# Patient Record
Sex: FEMALE | Race: BLACK OR AFRICAN AMERICAN | NOT HISPANIC OR LATINO | Employment: OTHER | URBAN - METROPOLITAN AREA
[De-identification: names, ages, dates, MRNs, and addresses within clinical notes are randomized per-mention and may not be internally consistent; named-entity substitution may affect disease eponyms.]

---

## 2019-03-28 PROBLEM — R10.13 ABDOMINAL PAIN, EPIGASTRIC: Status: ACTIVE | Noted: 2019-03-28

## 2021-10-15 ENCOUNTER — OFFICE VISIT (OUTPATIENT)
Dept: ENDOCRINOLOGY | Facility: CLINIC | Age: 41
End: 2021-10-15
Payer: COMMERCIAL

## 2021-10-15 VITALS
HEIGHT: 66 IN | HEART RATE: 101 BPM | SYSTOLIC BLOOD PRESSURE: 126 MMHG | BODY MASS INDEX: 40.04 KG/M2 | WEIGHT: 249.13 LBS | DIASTOLIC BLOOD PRESSURE: 74 MMHG | OXYGEN SATURATION: 98 %

## 2021-10-15 DIAGNOSIS — E03.4 HYPOTHYROIDISM DUE TO ACQUIRED ATROPHY OF THYROID: ICD-10-CM

## 2021-10-15 DIAGNOSIS — E78.2 MIXED HYPERLIPIDEMIA: ICD-10-CM

## 2021-10-15 DIAGNOSIS — Z79.4 TYPE 2 DIABETES MELLITUS WITH MILD NONPROLIFERATIVE RETINOPATHY OF RIGHT EYE, WITH LONG-TERM CURRENT USE OF INSULIN, MACULAR EDEMA PRESENCE UNSPECIFIED: Primary | ICD-10-CM

## 2021-10-15 DIAGNOSIS — E11.3291 TYPE 2 DIABETES MELLITUS WITH MILD NONPROLIFERATIVE RETINOPATHY OF RIGHT EYE, WITH LONG-TERM CURRENT USE OF INSULIN, MACULAR EDEMA PRESENCE UNSPECIFIED: Primary | ICD-10-CM

## 2021-10-15 PROBLEM — R10.13 ABDOMINAL PAIN, EPIGASTRIC: Status: RESOLVED | Noted: 2019-03-28 | Resolved: 2021-10-15

## 2021-10-15 PROCEDURE — 99999 PR PBB SHADOW E&M-NEW PATIENT-LVL V: ICD-10-PCS | Mod: PBBFAC,,, | Performed by: STUDENT IN AN ORGANIZED HEALTH CARE EDUCATION/TRAINING PROGRAM

## 2021-10-15 PROCEDURE — 99999 PR PBB SHADOW E&M-NEW PATIENT-LVL V: CPT | Mod: PBBFAC,,, | Performed by: STUDENT IN AN ORGANIZED HEALTH CARE EDUCATION/TRAINING PROGRAM

## 2021-10-15 PROCEDURE — 99204 PR OFFICE/OUTPT VISIT, NEW, LEVL IV, 45-59 MIN: ICD-10-PCS | Mod: S$GLB,,, | Performed by: STUDENT IN AN ORGANIZED HEALTH CARE EDUCATION/TRAINING PROGRAM

## 2021-10-15 PROCEDURE — 99204 OFFICE O/P NEW MOD 45 MIN: CPT | Mod: S$GLB,,, | Performed by: STUDENT IN AN ORGANIZED HEALTH CARE EDUCATION/TRAINING PROGRAM

## 2021-10-15 RX ORDER — MELOXICAM 15 MG/1
15 TABLET ORAL DAILY PRN
COMMUNITY
Start: 2021-08-17 | End: 2022-09-10

## 2021-10-15 RX ORDER — TIMOLOL MALEATE 5 MG/ML
SOLUTION/ DROPS OPHTHALMIC
COMMUNITY
Start: 2021-10-11 | End: 2023-02-13

## 2021-10-15 RX ORDER — INSULIN LISPRO 100 [IU]/ML
INJECTION, SOLUTION INTRAVENOUS; SUBCUTANEOUS
COMMUNITY
Start: 2021-10-08 | End: 2022-01-07 | Stop reason: SDUPTHER

## 2021-10-15 RX ORDER — INSULIN GLARGINE 100 [IU]/ML
40 INJECTION, SOLUTION SUBCUTANEOUS NIGHTLY
Qty: 1 BOX | Refills: 5 | Status: SHIPPED | OUTPATIENT
Start: 2021-10-15 | End: 2022-01-31

## 2021-10-15 RX ORDER — DULAGLUTIDE 3 MG/.5ML
3 INJECTION, SOLUTION SUBCUTANEOUS
Qty: 4 PEN | Refills: 11 | Status: SHIPPED | OUTPATIENT
Start: 2021-10-15 | End: 2021-11-29 | Stop reason: SDUPTHER

## 2021-10-15 RX ORDER — BISOPROLOL FUMARATE 5 MG/1
5 TABLET, FILM COATED ORAL DAILY
COMMUNITY
Start: 2021-08-17

## 2021-10-15 RX ORDER — PEN NEEDLE, DIABETIC 31 GX5/16"
NEEDLE, DISPOSABLE MISCELLANEOUS
COMMUNITY
Start: 2021-05-01 | End: 2021-12-27 | Stop reason: SDUPTHER

## 2021-10-15 RX ORDER — DULAGLUTIDE 1.5 MG/.5ML
1.5 INJECTION, SOLUTION SUBCUTANEOUS
COMMUNITY
Start: 2021-09-22 | End: 2021-10-15

## 2021-10-15 RX ORDER — HYDROCHLOROTHIAZIDE 12.5 MG/1
12.5 TABLET ORAL EVERY MORNING
COMMUNITY
Start: 2021-08-24

## 2021-10-15 RX ORDER — ATORVASTATIN CALCIUM 40 MG/1
40 TABLET, FILM COATED ORAL NIGHTLY
COMMUNITY
Start: 2021-08-17

## 2021-10-15 RX ORDER — FLASH GLUCOSE SENSOR
1 KIT MISCELLANEOUS
Qty: 6 KIT | Refills: 3 | Status: SHIPPED | OUTPATIENT
Start: 2021-10-15 | End: 2022-01-13

## 2021-10-15 RX ORDER — ESCITALOPRAM OXALATE 20 MG/1
20 TABLET ORAL DAILY
COMMUNITY
Start: 2021-08-17

## 2021-10-15 RX ORDER — LANCING DEVICE
EACH MISCELLANEOUS
COMMUNITY
Start: 2021-06-17

## 2021-10-15 RX ORDER — DIPHENHYDRAMINE HCL 25 MG
TABLET ORAL
COMMUNITY
Start: 2021-06-17

## 2021-10-15 RX ORDER — INSULIN GLARGINE 100 [IU]/ML
INJECTION, SOLUTION SUBCUTANEOUS
COMMUNITY
Start: 2021-09-22 | End: 2021-10-15 | Stop reason: SDUPTHER

## 2021-10-15 RX ORDER — CYCLOBENZAPRINE HCL 5 MG
5 TABLET ORAL 2 TIMES DAILY PRN
COMMUNITY
Start: 2021-07-28 | End: 2022-09-10

## 2021-10-15 RX ORDER — CALCIUM CITRATE/VITAMIN D3 200MG-6.25
TABLET ORAL
COMMUNITY
Start: 2021-08-17

## 2021-10-18 DIAGNOSIS — E03.4 HYPOTHYROIDISM DUE TO ACQUIRED ATROPHY OF THYROID: Primary | ICD-10-CM

## 2021-10-18 RX ORDER — LEVOTHYROXINE SODIUM 112 UG/1
112 TABLET ORAL
Qty: 30 TABLET | Refills: 11 | Status: SHIPPED | OUTPATIENT
Start: 2021-10-18 | End: 2021-12-14

## 2021-10-20 ENCOUNTER — TELEPHONE (OUTPATIENT)
Dept: ENDOCRINOLOGY | Facility: CLINIC | Age: 41
End: 2021-10-20

## 2021-11-29 ENCOUNTER — OFFICE VISIT (OUTPATIENT)
Dept: ENDOCRINOLOGY | Facility: CLINIC | Age: 41
End: 2021-11-29
Payer: COMMERCIAL

## 2021-11-29 VITALS
DIASTOLIC BLOOD PRESSURE: 94 MMHG | HEIGHT: 66 IN | HEART RATE: 81 BPM | BODY MASS INDEX: 40.39 KG/M2 | OXYGEN SATURATION: 98 % | SYSTOLIC BLOOD PRESSURE: 138 MMHG | WEIGHT: 251.31 LBS

## 2021-11-29 DIAGNOSIS — E11.3291 TYPE 2 DIABETES MELLITUS WITH MILD NONPROLIFERATIVE RETINOPATHY OF RIGHT EYE, WITH LONG-TERM CURRENT USE OF INSULIN, MACULAR EDEMA PRESENCE UNSPECIFIED: ICD-10-CM

## 2021-11-29 DIAGNOSIS — Z79.4 TYPE 2 DIABETES MELLITUS WITH MILD NONPROLIFERATIVE RETINOPATHY OF RIGHT EYE, WITH LONG-TERM CURRENT USE OF INSULIN, MACULAR EDEMA PRESENCE UNSPECIFIED: ICD-10-CM

## 2021-11-29 DIAGNOSIS — E78.2 MIXED HYPERLIPIDEMIA: ICD-10-CM

## 2021-11-29 DIAGNOSIS — E03.4 HYPOTHYROIDISM DUE TO ACQUIRED ATROPHY OF THYROID: Primary | ICD-10-CM

## 2021-11-29 PROCEDURE — 99214 PR OFFICE/OUTPT VISIT, EST, LEVL IV, 30-39 MIN: ICD-10-PCS | Mod: 25,S$GLB,, | Performed by: STUDENT IN AN ORGANIZED HEALTH CARE EDUCATION/TRAINING PROGRAM

## 2021-11-29 PROCEDURE — 99214 OFFICE O/P EST MOD 30 MIN: CPT | Mod: 25,S$GLB,, | Performed by: STUDENT IN AN ORGANIZED HEALTH CARE EDUCATION/TRAINING PROGRAM

## 2021-11-29 PROCEDURE — 95251 PR GLUCOSE MONITOR, 72 HOUR, PHYS INTERP: ICD-10-PCS | Mod: S$GLB,,, | Performed by: STUDENT IN AN ORGANIZED HEALTH CARE EDUCATION/TRAINING PROGRAM

## 2021-11-29 PROCEDURE — 95251 CONT GLUC MNTR ANALYSIS I&R: CPT | Mod: S$GLB,,, | Performed by: STUDENT IN AN ORGANIZED HEALTH CARE EDUCATION/TRAINING PROGRAM

## 2021-11-29 PROCEDURE — 99999 PR PBB SHADOW E&M-EST. PATIENT-LVL III: CPT | Mod: PBBFAC,,, | Performed by: STUDENT IN AN ORGANIZED HEALTH CARE EDUCATION/TRAINING PROGRAM

## 2021-11-29 PROCEDURE — 99999 PR PBB SHADOW E&M-EST. PATIENT-LVL III: ICD-10-PCS | Mod: PBBFAC,,, | Performed by: STUDENT IN AN ORGANIZED HEALTH CARE EDUCATION/TRAINING PROGRAM

## 2021-11-29 RX ORDER — TRAMADOL HYDROCHLORIDE 50 MG/1
50 TABLET ORAL 2 TIMES DAILY PRN
COMMUNITY
Start: 2021-10-28 | End: 2022-09-10

## 2021-11-29 RX ORDER — DULAGLUTIDE 3 MG/.5ML
3 INJECTION, SOLUTION SUBCUTANEOUS
Qty: 4 PEN | Refills: 11 | Status: SHIPPED | OUTPATIENT
Start: 2021-11-29 | End: 2022-12-05 | Stop reason: SDUPTHER

## 2021-11-29 RX ORDER — DULOXETIN HYDROCHLORIDE 30 MG/1
30 CAPSULE, DELAYED RELEASE ORAL DAILY
COMMUNITY
Start: 2021-10-28

## 2021-11-29 RX ORDER — INSULIN LISPRO-AABC 100 [IU]/ML
INJECTION, SOLUTION SUBCUTANEOUS
COMMUNITY
Start: 2021-11-10 | End: 2022-01-07

## 2021-11-29 RX ORDER — EMPAGLIFLOZIN 25 MG/1
25 TABLET, FILM COATED ORAL DAILY
Qty: 90 TABLET | Refills: 3 | Status: CANCELLED | OUTPATIENT
Start: 2021-11-29

## 2021-11-29 RX ORDER — MEDROXYPROGESTERONE ACETATE 150 MG/ML
INJECTION, SUSPENSION INTRAMUSCULAR
COMMUNITY

## 2021-11-29 RX ORDER — FLUCONAZOLE 150 MG/1
150 TABLET ORAL ONCE
COMMUNITY
Start: 2021-10-28 | End: 2022-11-16 | Stop reason: SDUPTHER

## 2021-12-14 DIAGNOSIS — E03.4 HYPOTHYROIDISM DUE TO ACQUIRED ATROPHY OF THYROID: Primary | ICD-10-CM

## 2021-12-14 DIAGNOSIS — Z79.4 TYPE 2 DIABETES MELLITUS WITH MILD NONPROLIFERATIVE RETINOPATHY OF RIGHT EYE, WITH LONG-TERM CURRENT USE OF INSULIN, MACULAR EDEMA PRESENCE UNSPECIFIED: ICD-10-CM

## 2021-12-14 DIAGNOSIS — E11.3291 TYPE 2 DIABETES MELLITUS WITH MILD NONPROLIFERATIVE RETINOPATHY OF RIGHT EYE, WITH LONG-TERM CURRENT USE OF INSULIN, MACULAR EDEMA PRESENCE UNSPECIFIED: ICD-10-CM

## 2021-12-14 RX ORDER — LEVOTHYROXINE SODIUM 137 UG/1
137 TABLET ORAL
Qty: 30 TABLET | Refills: 11 | Status: SHIPPED | OUTPATIENT
Start: 2021-12-14 | End: 2022-05-04

## 2021-12-27 ENCOUNTER — TELEPHONE (OUTPATIENT)
Dept: ENDOCRINOLOGY | Facility: CLINIC | Age: 41
End: 2021-12-27
Payer: COMMERCIAL

## 2021-12-27 RX ORDER — PEN NEEDLE, DIABETIC 31 GX5/16"
NEEDLE, DISPOSABLE MISCELLANEOUS
Qty: 100 EACH | Refills: 5 | Status: SHIPPED | OUTPATIENT
Start: 2021-12-27 | End: 2022-06-07 | Stop reason: SDUPTHER

## 2022-01-07 ENCOUNTER — TELEPHONE (OUTPATIENT)
Dept: ENDOCRINOLOGY | Facility: CLINIC | Age: 42
End: 2022-01-07
Payer: COMMERCIAL

## 2022-01-07 DIAGNOSIS — E11.3291 TYPE 2 DIABETES MELLITUS WITH MILD NONPROLIFERATIVE RETINOPATHY OF RIGHT EYE, WITH LONG-TERM CURRENT USE OF INSULIN, MACULAR EDEMA PRESENCE UNSPECIFIED: Primary | ICD-10-CM

## 2022-01-07 DIAGNOSIS — Z79.4 TYPE 2 DIABETES MELLITUS WITH MILD NONPROLIFERATIVE RETINOPATHY OF RIGHT EYE, WITH LONG-TERM CURRENT USE OF INSULIN, MACULAR EDEMA PRESENCE UNSPECIFIED: Primary | ICD-10-CM

## 2022-01-07 RX ORDER — INSULIN LISPRO 100 [IU]/ML
15 INJECTION, SOLUTION INTRAVENOUS; SUBCUTANEOUS
Qty: 15 ML | Refills: 11 | Status: SHIPPED | OUTPATIENT
Start: 2022-01-07 | End: 2022-01-07

## 2022-01-07 RX ORDER — INSULIN ASPART 100 [IU]/ML
15 INJECTION, SOLUTION INTRAVENOUS; SUBCUTANEOUS
Qty: 15 ML | Refills: 11 | Status: SHIPPED | OUTPATIENT
Start: 2022-01-07 | End: 2022-01-18 | Stop reason: SDUPTHER

## 2022-01-07 NOTE — TELEPHONE ENCOUNTER
----- Message from Dilma Biswas sent at 2022 10:32 AM CST -----  Contact: PATIENT  Alejandrina Hernandez  MRN: [id number]  : 1980  PCP: Luciano Clark  Home Phone      [phone]  Work Phone      Not on file.  Mobile          [phone]      MESSAGE: Patient states that she has been having to give herself more insulin than is prescribed and she is now out.  She has not been able to give herself the medication all week and her insurance will not pay for it.  She would like to know if Dr. Roberts can rewrite the prescription to increase the dose and send a new prescription to [facility].        Phone: [phone]

## 2022-01-18 ENCOUNTER — TELEPHONE (OUTPATIENT)
Dept: NEUROLOGY | Facility: CLINIC | Age: 42
End: 2022-01-18
Payer: COMMERCIAL

## 2022-01-18 DIAGNOSIS — Z79.4 TYPE 2 DIABETES MELLITUS WITH MILD NONPROLIFERATIVE RETINOPATHY OF RIGHT EYE, WITH LONG-TERM CURRENT USE OF INSULIN, MACULAR EDEMA PRESENCE UNSPECIFIED: ICD-10-CM

## 2022-01-18 DIAGNOSIS — E11.3291 TYPE 2 DIABETES MELLITUS WITH MILD NONPROLIFERATIVE RETINOPATHY OF RIGHT EYE, WITH LONG-TERM CURRENT USE OF INSULIN, MACULAR EDEMA PRESENCE UNSPECIFIED: ICD-10-CM

## 2022-01-18 RX ORDER — INSULIN ASPART 100 [IU]/ML
INJECTION, SOLUTION INTRAVENOUS; SUBCUTANEOUS
Qty: 18 ML | Refills: 11 | Status: SHIPPED | OUTPATIENT
Start: 2022-01-18 | End: 2022-01-31

## 2022-01-31 ENCOUNTER — OFFICE VISIT (OUTPATIENT)
Dept: ENDOCRINOLOGY | Facility: CLINIC | Age: 42
End: 2022-01-31
Payer: COMMERCIAL

## 2022-01-31 VITALS
BODY MASS INDEX: 40.67 KG/M2 | DIASTOLIC BLOOD PRESSURE: 84 MMHG | HEIGHT: 66 IN | SYSTOLIC BLOOD PRESSURE: 142 MMHG | WEIGHT: 253.06 LBS

## 2022-01-31 DIAGNOSIS — E03.4 HYPOTHYROIDISM DUE TO ACQUIRED ATROPHY OF THYROID: ICD-10-CM

## 2022-01-31 DIAGNOSIS — E11.3291 TYPE 2 DIABETES MELLITUS WITH MILD NONPROLIFERATIVE RETINOPATHY OF RIGHT EYE, WITH LONG-TERM CURRENT USE OF INSULIN, MACULAR EDEMA PRESENCE UNSPECIFIED: ICD-10-CM

## 2022-01-31 DIAGNOSIS — E78.2 MIXED HYPERLIPIDEMIA: ICD-10-CM

## 2022-01-31 DIAGNOSIS — Z79.4 TYPE 2 DIABETES MELLITUS WITH MILD NONPROLIFERATIVE RETINOPATHY OF RIGHT EYE, WITH LONG-TERM CURRENT USE OF INSULIN, MACULAR EDEMA PRESENCE UNSPECIFIED: ICD-10-CM

## 2022-01-31 PROCEDURE — 95251 CONT GLUC MNTR ANALYSIS I&R: CPT | Mod: S$GLB,,, | Performed by: STUDENT IN AN ORGANIZED HEALTH CARE EDUCATION/TRAINING PROGRAM

## 2022-01-31 PROCEDURE — 99214 OFFICE O/P EST MOD 30 MIN: CPT | Mod: 25,S$GLB,, | Performed by: STUDENT IN AN ORGANIZED HEALTH CARE EDUCATION/TRAINING PROGRAM

## 2022-01-31 PROCEDURE — 99999 PR PBB SHADOW E&M-EST. PATIENT-LVL V: CPT | Mod: PBBFAC,,, | Performed by: STUDENT IN AN ORGANIZED HEALTH CARE EDUCATION/TRAINING PROGRAM

## 2022-01-31 PROCEDURE — 99999 PR PBB SHADOW E&M-EST. PATIENT-LVL V: ICD-10-PCS | Mod: PBBFAC,,, | Performed by: STUDENT IN AN ORGANIZED HEALTH CARE EDUCATION/TRAINING PROGRAM

## 2022-01-31 PROCEDURE — 95251 PR GLUCOSE MONITOR, 72 HOUR, PHYS INTERP: ICD-10-PCS | Mod: S$GLB,,, | Performed by: STUDENT IN AN ORGANIZED HEALTH CARE EDUCATION/TRAINING PROGRAM

## 2022-01-31 PROCEDURE — 99214 PR OFFICE/OUTPT VISIT, EST, LEVL IV, 30-39 MIN: ICD-10-PCS | Mod: 25,S$GLB,, | Performed by: STUDENT IN AN ORGANIZED HEALTH CARE EDUCATION/TRAINING PROGRAM

## 2022-01-31 RX ORDER — TOBRAMYCIN 3 MG/ML
SOLUTION/ DROPS OPHTHALMIC
COMMUNITY
Start: 2022-01-19 | End: 2023-02-13

## 2022-01-31 RX ORDER — INSULIN LISPRO 100 [IU]/ML
20 INJECTION, SOLUTION INTRAVENOUS; SUBCUTANEOUS
Qty: 18 ML | Refills: 11 | Status: SHIPPED | OUTPATIENT
Start: 2022-01-31 | End: 2023-02-16 | Stop reason: SDUPTHER

## 2022-01-31 RX ORDER — FLASH GLUCOSE SENSOR
KIT MISCELLANEOUS
COMMUNITY
Start: 2022-01-13 | End: 2022-08-03 | Stop reason: SDUPTHER

## 2022-01-31 RX ORDER — INSULIN GLARGINE 300 [IU]/ML
60 INJECTION, SOLUTION SUBCUTANEOUS NIGHTLY
Qty: 4 PEN | Refills: 11 | Status: SHIPPED | OUTPATIENT
Start: 2022-01-31 | End: 2022-10-03 | Stop reason: SDUPTHER

## 2022-01-31 RX ORDER — METFORMIN HYDROCHLORIDE 500 MG/1
500 TABLET, EXTENDED RELEASE ORAL 2 TIMES DAILY WITH MEALS
Qty: 180 TABLET | Refills: 3 | Status: SHIPPED | OUTPATIENT
Start: 2022-01-31 | End: 2023-01-10 | Stop reason: SDUPTHER

## 2022-01-31 RX ORDER — INSULIN LISPRO 100 [IU]/ML
20 INJECTION, SOLUTION INTRAVENOUS; SUBCUTANEOUS
COMMUNITY
Start: 2022-01-07 | End: 2022-01-31

## 2022-01-31 NOTE — ASSESSMENT & PLAN NOTE
Levothyroxine increased to 137 mcg about 8 weeks ago  TSH slightly below goal  Will keep dose for now - she is young with no CVD   If TSH on next check will slightly reduce dose (will need and average dose in between 125 and 137)

## 2022-01-31 NOTE — PROGRESS NOTES
Subjective:      Patient ID: Alejandrina Hernandez is a 41 y.o. female.    Chief Complaint:  Follow-up      History of Present Illness  This is a 41 y.o. female. with a past medical history of DM2, HLD, HTN, obesity here for DM2.    With regards to type 2 diabetes mellitus  Diagnosed in late 20s - had frequent miscarriages which prompted workup    Current diabetic medications:  - Lantus 48 U HS   - Humalog 15 U before meals + sliding scale  - Trulicity 3 mg SQ weekly    Past diabetes medications:  - Metformin - diarrhea, has not tried XR formulation  - Jardiance - frequent yeast infections    Known diabetic complications: retinopathy    Weight trend: stable  Prior visit with diabetes education: yes    Current diet: 2 meals usually, sometimes 3  Current exercise: Limited with back pain    Wt Readings from Last 10 Encounters:   01/31/22 114.8 kg (253 lb 1.4 oz)   11/29/21 114 kg (251 lb 5.2 oz)   11/25/21 114.3 kg (252 lb)   10/15/21 113 kg (249 lb 1.9 oz)   08/11/21 106.6 kg (235 lb)   09/22/20 106.8 kg (235 lb 8 oz)   09/16/19 104.3 kg (230 lb)   03/27/19 114.3 kg (252 lb)   05/29/17 108 kg (238 lb)   10/17/12 114.2 kg (251 lb 12.3 oz)           Reviewed CGM data for past 14 days:  Average glucose 312 mg/dL  Time in range 1%  Time above range 99%  Hypoglycemia 0%  GMI 10.8%      Mother DM2  Maternal grandfather DM2  Paternal grandmother DM2  A lot of cousins and aunts with DM2    Diabetes Management Status  Statin: Taking  ACE/ARB: Not taking    Screening or Prevention Patient's value   HgA1C Testing and Control   Lab Results   Component Value Date    HGBA1C 10.3 (H) 01/28/2022      Lipid profile : 10/18/2021   LDL control Lab Results   Component Value Date    LDLCALC 72.4 10/18/2021      Nephropathy screening Lab Results   Component Value Date    MICALBCREAT 356.2 (H) 10/18/2021      Blood pressure BP Readings from Last 1 Encounters:   01/31/22 (!) 142/84      Dilated retinal exam Most Recent Eye Exam Date: Not Found  "  Foot exam : 10/15/2021       With regards to hypothyroidism  Currently on levothyroxine 137 mcg daily   Reports takes 30 min before eating or drinking anything other than water.     Lab Results   Component Value Date    TSH 0.282 (L) 01/28/2022         Review of Systems  As above    Social and family history reviewed  Current medications and allergies reviewed    Objective:   BP (!) 142/84 (BP Location: Right arm, Patient Position: Sitting)   Ht 5' 6" (1.676 m)   Wt 114.8 kg (253 lb 1.4 oz)   BMI 40.85 kg/m²   Physical Exam   Some areas of lipodystrophy in abdominal region      BP Readings from Last 1 Encounters:   01/31/22 (!) 142/84      Wt Readings from Last 1 Encounters:   01/31/22 0918 114.8 kg (253 lb 1.4 oz)     Body mass index is 40.85 kg/m².    Lab Review:   Lab Results   Component Value Date    HGBA1C 10.3 (H) 01/28/2022     Lab Results   Component Value Date    CHOL 137 10/18/2021    HDL 32 (L) 10/18/2021    LDLCALC 72.4 10/18/2021    TRIG 163 (H) 10/18/2021    CHOLHDL 23.4 10/18/2021     Lab Results   Component Value Date     01/28/2022    K 4.3 01/28/2022     01/28/2022    CO2 24 01/28/2022     (H) 01/28/2022    BUN 11 01/28/2022    CREATININE 0.8 01/28/2022    CALCIUM 9.5 01/28/2022    PROT 7.3 01/28/2022    ALBUMIN 4.1 01/28/2022    BILITOT 0.8 01/28/2022    ALKPHOS 43 (L) 01/28/2022    AST 15 01/28/2022    ALT 27 01/28/2022    ANIONGAP 11 01/28/2022    ESTGFRAFRICA >60.0 01/28/2022    EGFRNONAA >60.0 01/28/2022    TSH 0.282 (L) 01/28/2022       All pertinent labs reviewed    Assessment and Plan     Type 2 diabetes mellitus with mild nonproliferative retinopathy of right eye, with long-term current use of insulin  Reviewed CGM data. Uncontrolled with time in range only 1% and above range 99%. GMI and A1c > 10%. A C-peptide was detectable so we should try to target insulin resistance as much as possible. Already on GLP-1 RA. Discussed trying metformin XR - agreeable.    Can " consider re-adding SGLT-2i later on - explained that risk for yeast infections tends to improve after glucose control achieved.    For now will increase basal/bolus regimen by 20%.    Will change glargine to U-300.    Plan  - Change Lantus to Toujeo 60 U HS  - Increase Humalog to 20 U AC + SSI  - Start metformin  mg daily, increase to BID after 1 week if tolerated  - Continue Trulicity to 3 mg SQ weekly  - Continue FreeStyle Maritza 2 CGM  - Yearly eye exam encouraged  - Proper foot care discussed  - F/u 4 weeks    Mixed hyperlipidemia  LDL at goal  Continue statin    Hypothyroidism due to acquired atrophy of thyroid  Levothyroxine increased to 137 mcg about 8 weeks ago  TSH slightly below goal  Will keep dose for now - she is young with no CVD   If TSH on next check will slightly reduce dose (will need and average dose in between 125 and 137)    F/u 4 weeks    Mendez Roberts MD  Endocrinology

## 2022-01-31 NOTE — ASSESSMENT & PLAN NOTE
Reviewed CGM data. Uncontrolled with time in range only 1% and above range 99%. GMI and A1c > 10%. A C-peptide was detectable so we should try to target insulin resistance as much as possible. Already on GLP-1 RA. Discussed trying metformin XR - agreeable.    Can consider re-adding SGLT-2i later on - explained that risk for yeast infections tends to improve after glucose control achieved.    For now will increase basal/bolus regimen by 20%.    Will change glargine to U-300.    Plan  - Change Lantus to Toujeo 60 U HS  - Increase Humalog to 20 U AC + SSI  - Start metformin  mg daily, increase to BID after 1 week if tolerated  - Continue Trulicity to 3 mg SQ weekly  - Continue FreeStyle Maritza 2 CGM  - Yearly eye exam encouraged  - Proper foot care discussed  - F/u 4 weeks

## 2022-01-31 NOTE — PATIENT INSTRUCTIONS
Increase Lantus to 60 units daily - after you ran out of the Lantus - please use Toujeo 60 units daily    Continue Humalog 20 units before meals    Start metformin extended release one tablet a day - if after 1 week you are tolerating it, please increase to twice a day. Metformin should be taken with meals.

## 2022-03-04 ENCOUNTER — OFFICE VISIT (OUTPATIENT)
Dept: ENDOCRINOLOGY | Facility: CLINIC | Age: 42
End: 2022-03-04
Payer: COMMERCIAL

## 2022-03-04 VITALS
DIASTOLIC BLOOD PRESSURE: 88 MMHG | SYSTOLIC BLOOD PRESSURE: 134 MMHG | HEIGHT: 66 IN | WEIGHT: 253.06 LBS | BODY MASS INDEX: 40.67 KG/M2

## 2022-03-04 DIAGNOSIS — E11.3291 TYPE 2 DIABETES MELLITUS WITH MILD NONPROLIFERATIVE RETINOPATHY OF RIGHT EYE, WITH LONG-TERM CURRENT USE OF INSULIN, MACULAR EDEMA PRESENCE UNSPECIFIED: Primary | ICD-10-CM

## 2022-03-04 DIAGNOSIS — E78.2 MIXED HYPERLIPIDEMIA: ICD-10-CM

## 2022-03-04 DIAGNOSIS — Z79.4 TYPE 2 DIABETES MELLITUS WITH MILD NONPROLIFERATIVE RETINOPATHY OF RIGHT EYE, WITH LONG-TERM CURRENT USE OF INSULIN, MACULAR EDEMA PRESENCE UNSPECIFIED: Primary | ICD-10-CM

## 2022-03-04 DIAGNOSIS — E03.4 HYPOTHYROIDISM DUE TO ACQUIRED ATROPHY OF THYROID: ICD-10-CM

## 2022-03-04 PROCEDURE — 99214 OFFICE O/P EST MOD 30 MIN: CPT | Mod: 25,S$GLB,, | Performed by: STUDENT IN AN ORGANIZED HEALTH CARE EDUCATION/TRAINING PROGRAM

## 2022-03-04 PROCEDURE — 99214 PR OFFICE/OUTPT VISIT, EST, LEVL IV, 30-39 MIN: ICD-10-PCS | Mod: 25,S$GLB,, | Performed by: STUDENT IN AN ORGANIZED HEALTH CARE EDUCATION/TRAINING PROGRAM

## 2022-03-04 PROCEDURE — 99999 PR PBB SHADOW E&M-EST. PATIENT-LVL III: CPT | Mod: PBBFAC,,, | Performed by: STUDENT IN AN ORGANIZED HEALTH CARE EDUCATION/TRAINING PROGRAM

## 2022-03-04 PROCEDURE — 99999 PR PBB SHADOW E&M-EST. PATIENT-LVL III: ICD-10-PCS | Mod: PBBFAC,,, | Performed by: STUDENT IN AN ORGANIZED HEALTH CARE EDUCATION/TRAINING PROGRAM

## 2022-03-04 NOTE — ASSESSMENT & PLAN NOTE
TSH slightly below goal  Will keep dose for now - she is young with no CVD   If TSH on next check still low, will slightly reduce dose (will need and average dose in between 125 and 137)

## 2022-03-04 NOTE — PROGRESS NOTES
Subjective:      Patient ID: Alejandrina Hernandez is a 41 y.o. female.    Chief Complaint:  Follow-up      History of Present Illness  This is a 41 y.o. female. with a past medical history of DM2, HLD, HTN, obesity here for DM2.    With regards to type 2 diabetes mellitus  Diagnosed in late 20s - had frequent miscarriages which prompted workup    Current diabetic medications:  - Toujeo 60 U HS   - Humalog 20 U before meals + sliding scale  - Metformin  mg daily  - Trulicity 3 mg SQ weekly    Past diabetes medications:  - Metformin - diarrhea  - Jardiance - frequent yeast infections - persistent even after Diflucan     Known diabetic complications: retinopathy    Weight trend: stable  Prior visit with diabetes education: yes    Current diet: 2 meals usually, sometimes 3  Current exercise: Limited with back pain      Wt Readings from Last 5 Encounters:   03/04/22 114.8 kg (253 lb 1.4 oz)   01/31/22 114.8 kg (253 lb 1.4 oz)   11/29/21 114 kg (251 lb 5.2 oz)   11/25/21 114.3 kg (252 lb)   10/15/21 113 kg (249 lb 1.9 oz)         Reviewed CGM data for past 14 days:  Average glucose 253 mg/dL  Time in range 13%  Time above range 86%  Hypoglycemia 1%  GMI 9.4%    Mother DM2  Maternal grandfather DM2  Paternal grandmother DM2  A lot of cousins and aunts with DM2    Diabetes Management Status  Statin: Taking  ACE/ARB: Not taking - was on ACEi - cough    Screening or Prevention Patient's value   HgA1C Testing and Control   Lab Results   Component Value Date    HGBA1C 10.3 (H) 01/28/2022      Lipid profile : 10/18/2021   LDL control Lab Results   Component Value Date    LDLCALC 72.4 10/18/2021      Nephropathy screening Lab Results   Component Value Date    MICALBCREAT 356.2 (H) 10/18/2021      Blood pressure BP Readings from Last 1 Encounters:   03/04/22 134/88      Dilated retinal exam Most Recent Eye Exam Date: Not Found   Foot exam : 10/15/2021       With regards to hypothyroidism  Currently on levothyroxine 137 mcg  "daily   Reports takes 30 min before eating or drinking anything other than water.     Lab Results   Component Value Date    TSH 0.282 (L) 01/28/2022         Review of Systems  As above    Social and family history reviewed  Current medications and allergies reviewed    Objective:   /88 (BP Location: Right arm, Patient Position: Sitting)   Ht 5' 6" (1.676 m)   Wt 114.8 kg (253 lb 1.4 oz)   BMI 40.85 kg/m²   Physical Exam   Some areas of lipodystrophy in abdominal region      BP Readings from Last 1 Encounters:   03/04/22 134/88      Wt Readings from Last 1 Encounters:   03/04/22 0856 114.8 kg (253 lb 1.4 oz)     Body mass index is 40.85 kg/m².    Lab Review:   Lab Results   Component Value Date    HGBA1C 10.3 (H) 01/28/2022     Lab Results   Component Value Date    CHOL 137 10/18/2021    HDL 32 (L) 10/18/2021    LDLCALC 72.4 10/18/2021    TRIG 163 (H) 10/18/2021    CHOLHDL 23.4 10/18/2021     Lab Results   Component Value Date     01/28/2022    K 4.3 01/28/2022     01/28/2022    CO2 24 01/28/2022     (H) 01/28/2022    BUN 11 01/28/2022    CREATININE 0.8 01/28/2022    CALCIUM 9.5 01/28/2022    PROT 7.3 01/28/2022    ALBUMIN 4.1 01/28/2022    BILITOT 0.8 01/28/2022    ALKPHOS 43 (L) 01/28/2022    AST 15 01/28/2022    ALT 27 01/28/2022    ANIONGAP 11 01/28/2022    ESTGFRAFRICA >60.0 01/28/2022    EGFRNONAA >60.0 01/28/2022    TSH 0.282 (L) 01/28/2022       All pertinent labs reviewed    Assessment and Plan     Type 2 diabetes mellitus with mild nonproliferative retinopathy of right eye, with long-term current use of insulin  Control has improved with avg glucose down from 300 to 250. Still abvoe goal so will increase metformin and try to slowly get to max tolerate dose.     Can consider increasing Trulicity in the future. She dose not wish to go back on SGLT-2i given recurrent genital infections.    Plan  - Continue Toujeo 60 U HS  - Continue Humalog 20 U AC + SSI  - Increase metformin to " 1,000 mg daily - increase further if tolerated  - Continue Trulicity to 3 mg SQ weekly  - Continue FreeStyle Maritza 2 CGM  - Yearly eye exam encouraged  - Proper foot care discussed  - F/u 8 weeks with A1c    Hypothyroidism due to acquired atrophy of thyroid  TSH slightly below goal  Will keep dose for now - she is young with no CVD   If TSH on next check still low, will slightly reduce dose (will need and average dose in between 125 and 137)    Mixed hyperlipidemia  LDL at goal  Continue statin    F/u 8 weeks    Mendez Roberts MD  Endocrinology

## 2022-03-04 NOTE — ASSESSMENT & PLAN NOTE
Control has improved with avg glucose down from 300 to 250. Still abvoe goal so will increase metformin and try to slowly get to max tolerate dose.     Can consider increasing Trulicity in the future. She dose not wish to go back on SGLT-2i given recurrent genital infections.    Plan  - Continue Toujeo 60 U HS  - Continue Humalog 20 U AC + SSI  - Increase metformin to 1,000 mg daily - increase further if tolerated  - Continue Trulicity to 3 mg SQ weekly  - Continue FreeStyle Maritza 2 CGM  - Yearly eye exam encouraged  - Proper foot care discussed  - F/u 8 weeks with A1c

## 2022-03-04 NOTE — PATIENT INSTRUCTIONS
Increase metformin to 2 tablets, you can take them at the same time. If after 2 weeks you are tolerating you can increase to 3 tablets.    Continue same insulin doses    Continue Trulicity

## 2022-03-21 ENCOUNTER — TELEPHONE (OUTPATIENT)
Dept: ENDOCRINOLOGY | Facility: CLINIC | Age: 42
End: 2022-03-21
Payer: COMMERCIAL

## 2022-03-21 DIAGNOSIS — Z79.4 TYPE 2 DIABETES MELLITUS WITH MILD NONPROLIFERATIVE RETINOPATHY OF RIGHT EYE, WITH LONG-TERM CURRENT USE OF INSULIN, MACULAR EDEMA PRESENCE UNSPECIFIED: Primary | ICD-10-CM

## 2022-03-21 DIAGNOSIS — E11.3291 TYPE 2 DIABETES MELLITUS WITH MILD NONPROLIFERATIVE RETINOPATHY OF RIGHT EYE, WITH LONG-TERM CURRENT USE OF INSULIN, MACULAR EDEMA PRESENCE UNSPECIFIED: Primary | ICD-10-CM

## 2022-03-21 NOTE — TELEPHONE ENCOUNTER
----- Message from Sri Parks MA sent at 3/21/2022  8:36 AM CDT -----  Contact: self  Alejandrina Hernandez  MRN: [id number]  : 1980  PCP: Luciano Clark  Home Phone      [phone]  Work Phone      Not on file.  Mobile          [phone]      MESSAGE: Would like to see about getting referral to Dr Ley at [facility] for eyes.      Phone: [phone]

## 2022-03-28 ENCOUNTER — TELEPHONE (OUTPATIENT)
Dept: ENDOCRINOLOGY | Facility: CLINIC | Age: 42
End: 2022-03-28
Payer: COMMERCIAL

## 2022-03-28 NOTE — TELEPHONE ENCOUNTER
----- Message from Sir Parks MA sent at 3/28/2022  9:07 AM CDT -----  Contact: self  Alejandrina Hernandez  MRN: [id number]  : 1980  PCP: Luciano Clark  Home Phone      [phone]  Work Phone      Not on file.  Mobile          [phone]      MESSAGE: Would like to see about getting referral to Dr Ley at [facility] for eyes    Phone:  [phone]

## 2022-03-29 ENCOUNTER — TELEPHONE (OUTPATIENT)
Dept: OPHTHALMOLOGY | Facility: CLINIC | Age: 42
End: 2022-03-29
Payer: COMMERCIAL

## 2022-03-29 NOTE — TELEPHONE ENCOUNTER
----- Message from Ana Cisneros sent at 3/29/2022  8:25 AM CDT -----  Contact: Pt @ [phone]  Pt needing to schedule a new pt appt based off of referral in chart. Pls call her back to assist.

## 2022-05-02 ENCOUNTER — LAB VISIT (OUTPATIENT)
Dept: LAB | Facility: HOSPITAL | Age: 42
End: 2022-05-02
Attending: STUDENT IN AN ORGANIZED HEALTH CARE EDUCATION/TRAINING PROGRAM
Payer: COMMERCIAL

## 2022-05-02 DIAGNOSIS — E03.4 HYPOTHYROIDISM DUE TO ACQUIRED ATROPHY OF THYROID: ICD-10-CM

## 2022-05-02 DIAGNOSIS — E11.3291 TYPE 2 DIABETES MELLITUS WITH MILD NONPROLIFERATIVE RETINOPATHY OF RIGHT EYE, WITH LONG-TERM CURRENT USE OF INSULIN, MACULAR EDEMA PRESENCE UNSPECIFIED: ICD-10-CM

## 2022-05-02 DIAGNOSIS — Z79.4 TYPE 2 DIABETES MELLITUS WITH MILD NONPROLIFERATIVE RETINOPATHY OF RIGHT EYE, WITH LONG-TERM CURRENT USE OF INSULIN, MACULAR EDEMA PRESENCE UNSPECIFIED: ICD-10-CM

## 2022-05-02 LAB
ESTIMATED AVG GLUCOSE: 235 MG/DL (ref 68–131)
HBA1C MFR BLD: 9.8 % (ref 4–5.6)
T4 FREE SERPL-MCNC: 1.73 NG/DL (ref 0.71–1.51)
TSH SERPL DL<=0.005 MIU/L-ACNC: <0.01 UIU/ML (ref 0.4–4)

## 2022-05-02 PROCEDURE — 83036 HEMOGLOBIN GLYCOSYLATED A1C: CPT | Performed by: STUDENT IN AN ORGANIZED HEALTH CARE EDUCATION/TRAINING PROGRAM

## 2022-05-02 PROCEDURE — 84439 ASSAY OF FREE THYROXINE: CPT | Performed by: STUDENT IN AN ORGANIZED HEALTH CARE EDUCATION/TRAINING PROGRAM

## 2022-05-02 PROCEDURE — 36415 COLL VENOUS BLD VENIPUNCTURE: CPT | Performed by: STUDENT IN AN ORGANIZED HEALTH CARE EDUCATION/TRAINING PROGRAM

## 2022-05-02 PROCEDURE — 84443 ASSAY THYROID STIM HORMONE: CPT | Performed by: STUDENT IN AN ORGANIZED HEALTH CARE EDUCATION/TRAINING PROGRAM

## 2022-05-04 ENCOUNTER — OFFICE VISIT (OUTPATIENT)
Dept: ENDOCRINOLOGY | Facility: CLINIC | Age: 42
End: 2022-05-04
Payer: COMMERCIAL

## 2022-05-04 VITALS
WEIGHT: 248.69 LBS | SYSTOLIC BLOOD PRESSURE: 130 MMHG | HEIGHT: 66 IN | DIASTOLIC BLOOD PRESSURE: 86 MMHG | BODY MASS INDEX: 39.97 KG/M2

## 2022-05-04 DIAGNOSIS — E11.3291 TYPE 2 DIABETES MELLITUS WITH MILD NONPROLIFERATIVE RETINOPATHY OF RIGHT EYE, WITH LONG-TERM CURRENT USE OF INSULIN, MACULAR EDEMA PRESENCE UNSPECIFIED: Primary | ICD-10-CM

## 2022-05-04 DIAGNOSIS — Z79.4 TYPE 2 DIABETES MELLITUS WITH MILD NONPROLIFERATIVE RETINOPATHY OF RIGHT EYE, WITH LONG-TERM CURRENT USE OF INSULIN, MACULAR EDEMA PRESENCE UNSPECIFIED: Primary | ICD-10-CM

## 2022-05-04 DIAGNOSIS — E03.4 HYPOTHYROIDISM DUE TO ACQUIRED ATROPHY OF THYROID: ICD-10-CM

## 2022-05-04 DIAGNOSIS — E78.2 MIXED HYPERLIPIDEMIA: ICD-10-CM

## 2022-05-04 PROCEDURE — 99214 PR OFFICE/OUTPT VISIT, EST, LEVL IV, 30-39 MIN: ICD-10-PCS | Mod: 25,S$GLB,, | Performed by: STUDENT IN AN ORGANIZED HEALTH CARE EDUCATION/TRAINING PROGRAM

## 2022-05-04 PROCEDURE — 99214 OFFICE O/P EST MOD 30 MIN: CPT | Mod: 25,S$GLB,, | Performed by: STUDENT IN AN ORGANIZED HEALTH CARE EDUCATION/TRAINING PROGRAM

## 2022-05-04 PROCEDURE — 99999 PR PBB SHADOW E&M-EST. PATIENT-LVL IV: ICD-10-PCS | Mod: PBBFAC,,, | Performed by: STUDENT IN AN ORGANIZED HEALTH CARE EDUCATION/TRAINING PROGRAM

## 2022-05-04 PROCEDURE — 99999 PR PBB SHADOW E&M-EST. PATIENT-LVL IV: CPT | Mod: PBBFAC,,, | Performed by: STUDENT IN AN ORGANIZED HEALTH CARE EDUCATION/TRAINING PROGRAM

## 2022-05-04 PROCEDURE — 95251 PR GLUCOSE MONITOR, 72 HOUR, PHYS INTERP: ICD-10-PCS | Mod: S$GLB,,, | Performed by: STUDENT IN AN ORGANIZED HEALTH CARE EDUCATION/TRAINING PROGRAM

## 2022-05-04 PROCEDURE — 95251 CONT GLUC MNTR ANALYSIS I&R: CPT | Mod: S$GLB,,, | Performed by: STUDENT IN AN ORGANIZED HEALTH CARE EDUCATION/TRAINING PROGRAM

## 2022-05-04 RX ORDER — LEVOTHYROXINE SODIUM 125 UG/1
125 TABLET ORAL
Qty: 30 TABLET | Refills: 11 | Status: SHIPPED | OUTPATIENT
Start: 2022-05-04 | End: 2024-02-09

## 2022-05-04 NOTE — PATIENT INSTRUCTIONS
Change the thyroid pill to levothyroxine 125 mcg daily. Take fasting. If you forget to take, you may take 2 the next day.    Continue same medications for diabetes. At next visit if numbers still above 200 on average, may need to add a new medication or increase Toujeo.

## 2022-05-04 NOTE — ASSESSMENT & PLAN NOTE
Control has improved with avg glucose down from 250 to 205. She is unable to tolerate higher metformin doses. She dose not wish to go back on SGLT-2i given recurrent genital infections in the past. Benefit from increasing Trulicity will not be dramatic but can consider later on.    Given glucose has been improving visit by visit, will continue same regimen. She is becoming more aware about food choices.    Plan  - Continue Toujeo 60 U HS  - Continue Humalog 20 U AC + SSI  - Continue metformin 500 to 1,000 mg daily (sometimes takes 1 if having diarrhea)  - Continue Trulicity 3 mg SQ weekly  - Continue FreeStyle Maritza 2 CGM    F/u 3 months with labs

## 2022-05-04 NOTE — PROGRESS NOTES
Subjective:      Patient ID: Alejandrina Hernandez is a 41 y.o. female.    Chief Complaint:  Type 2 diabetes    History of Present Illness  This is a 41 y.o. female. with a past medical history of DM2, HLD, HTN, obesity here for DM2.    Type 2 diabetes mellitus  Diagnosed in late 20s - had frequent miscarriages which prompted workup    Current diabetic medications:  - Toujeo 60 U HS   - Humalog 20 U before meals + sliding scale  - Metformin  - 1,000 mg daily - some diarrhea  - Trulicity 3 mg SQ weekly    Past diabetes medications:  - Metformin - diarrhea  - Jardiance - frequent yeast infections - persistent even after Diflucan     Known diabetic complications: retinopathy    Weight trend: stable  Prior visit with diabetes education: yes    Current diet: 2 meals usually, sometimes 3  Current exercise: Limited with back pain      Wt Readings from Last 5 Encounters:   05/04/22 112.8 kg (248 lb 10.9 oz)   03/04/22 114.8 kg (253 lb 1.4 oz)   01/31/22 114.8 kg (253 lb 1.4 oz)   11/29/21 114 kg (251 lb 5.2 oz)   11/25/21 114.3 kg (252 lb)       Reviewed CGM data for past 14 days:  Average glucose 205 mg/dL  GMI: 8.2%  Time in range 30%  Time above range 70%  Hypoglycemia 0%      Mother DM2  Maternal grandfather DM2  Paternal grandmother DM2  A lot of cousins and aunts with DM2    Diabetes Management Status  Statin: Taking  ACE/ARB: Not taking - was on ACEi - cough    Screening or Prevention Patient's value   HgA1C Testing and Control   Lab Results   Component Value Date    HGBA1C 9.8 (H) 05/02/2022      Lipid profile : 10/18/2021   LDL control Lab Results   Component Value Date    LDLCALC 72.4 10/18/2021      Nephropathy screening Lab Results   Component Value Date    MICALBCREAT 356.2 (H) 10/18/2021      Blood pressure BP Readings from Last 1 Encounters:   05/04/22 130/86      Dilated retinal exam Most Recent Eye Exam Date: Not Found   Foot exam : 10/15/2021       Hypothyroidism  Currently on levothyroxine 137 mcg  "daily  Reports takes 30 min before eating or drinking anything other than water.     Lab Results   Component Value Date    TSH <0.010 (L) 05/02/2022         Review of Systems  As above    Social and family history reviewed  Current medications and allergies reviewed    Objective:   /86 (BP Location: Right arm, Patient Position: Sitting)   Ht 5' 6" (1.676 m)   Wt 112.8 kg (248 lb 10.9 oz)   BMI 40.14 kg/m²   Physical Exam   Some areas of lipodystrophy in abdominal region      BP Readings from Last 1 Encounters:   05/04/22 130/86      Wt Readings from Last 1 Encounters:   05/04/22 0846 112.8 kg (248 lb 10.9 oz)     Body mass index is 40.14 kg/m².    Lab Review:   Lab Results   Component Value Date    HGBA1C 9.8 (H) 05/02/2022     Lab Results   Component Value Date    CHOL 137 10/18/2021    HDL 32 (L) 10/18/2021    LDLCALC 72.4 10/18/2021    TRIG 163 (H) 10/18/2021    CHOLHDL 23.4 10/18/2021     Lab Results   Component Value Date     01/28/2022    K 4.3 01/28/2022     01/28/2022    CO2 24 01/28/2022     (H) 01/28/2022    BUN 11 01/28/2022    CREATININE 0.8 01/28/2022    CALCIUM 9.5 01/28/2022    PROT 7.3 01/28/2022    ALBUMIN 4.1 01/28/2022    BILITOT 0.8 01/28/2022    ALKPHOS 43 (L) 01/28/2022    AST 15 01/28/2022    ALT 27 01/28/2022    ANIONGAP 11 01/28/2022    ESTGFRAFRICA >60.0 01/28/2022    EGFRNONAA >60.0 01/28/2022    TSH <0.010 (L) 05/02/2022       All pertinent labs reviewed    Assessment and Plan     Type 2 diabetes mellitus with mild nonproliferative retinopathy of right eye, with long-term current use of insulin  Control has improved with avg glucose down from 250 to 205. She is unable to tolerate higher metformin doses. She dose not wish to go back on SGLT-2i given recurrent genital infections in the past. Benefit from increasing Trulicity will not be dramatic but can consider later on.    Given glucose has been improving visit by visit, will continue same regimen. She is " becoming more aware about food choices.    Plan  - Continue Toujeo 60 U HS  - Continue Humalog 20 U AC + SSI  - Continue metformin 500 to 1,000 mg daily (sometimes takes 1 if having diarrhea)  - Continue Trulicity 3 mg SQ weekly  - Continue FreeStyle Maritza 2 CGM    F/u 3 months with labs    Mixed hyperlipidemia  Continue statin  Recheck lipids with next labs    Hypothyroidism due to acquired atrophy of thyroid  TSH below goal  Decrease levothyroxine to 125 mcg daily  TSH with next labs    F/u 3 months    Mendez Roberts MD  Endocrinology

## 2022-05-13 ENCOUNTER — CLINICAL SUPPORT (OUTPATIENT)
Dept: OPHTHALMOLOGY | Facility: CLINIC | Age: 42
End: 2022-05-13
Payer: COMMERCIAL

## 2022-05-13 ENCOUNTER — OFFICE VISIT (OUTPATIENT)
Dept: OPHTHALMOLOGY | Facility: CLINIC | Age: 42
End: 2022-05-13
Payer: COMMERCIAL

## 2022-05-13 DIAGNOSIS — E11.3393 MODERATE NONPROLIFERATIVE DIABETIC RETINOPATHY OF BOTH EYES WITHOUT MACULAR EDEMA ASSOCIATED WITH TYPE 2 DIABETES MELLITUS: ICD-10-CM

## 2022-05-13 DIAGNOSIS — H25.13 NUCLEAR SCLEROSIS OF BOTH EYES: ICD-10-CM

## 2022-05-13 DIAGNOSIS — H40.003 GLAUCOMA SUSPECT OF BOTH EYES: Primary | ICD-10-CM

## 2022-05-13 PROCEDURE — 92133 POSTERIOR SEGMENT OCT OPTIC NERVE(OCULAR COHERENCE TOMOGRAPHY) - OU - BOTH EYES: ICD-10-PCS | Mod: S$GLB,,, | Performed by: STUDENT IN AN ORGANIZED HEALTH CARE EDUCATION/TRAINING PROGRAM

## 2022-05-13 PROCEDURE — 92020 GONIOSCOPY: CPT | Mod: S$GLB,,, | Performed by: STUDENT IN AN ORGANIZED HEALTH CARE EDUCATION/TRAINING PROGRAM

## 2022-05-13 PROCEDURE — 92083 EXTENDED VISUAL FIELD XM: CPT | Mod: S$GLB,,, | Performed by: STUDENT IN AN ORGANIZED HEALTH CARE EDUCATION/TRAINING PROGRAM

## 2022-05-13 PROCEDURE — 92083 HUMPHREY VISUAL FIELD - OU - BOTH EYES: ICD-10-PCS | Mod: S$GLB,,, | Performed by: STUDENT IN AN ORGANIZED HEALTH CARE EDUCATION/TRAINING PROGRAM

## 2022-05-13 PROCEDURE — 76514 ECHO EXAM OF EYE THICKNESS: CPT | Mod: S$GLB,,, | Performed by: STUDENT IN AN ORGANIZED HEALTH CARE EDUCATION/TRAINING PROGRAM

## 2022-05-13 PROCEDURE — 99204 OFFICE O/P NEW MOD 45 MIN: CPT | Mod: GC,S$GLB,, | Performed by: STUDENT IN AN ORGANIZED HEALTH CARE EDUCATION/TRAINING PROGRAM

## 2022-05-13 PROCEDURE — 99999 PR PBB SHADOW E&M-EST. PATIENT-LVL II: CPT | Mod: PBBFAC,,, | Performed by: STUDENT IN AN ORGANIZED HEALTH CARE EDUCATION/TRAINING PROGRAM

## 2022-05-13 PROCEDURE — 99204 PR OFFICE/OUTPT VISIT, NEW, LEVL IV, 45-59 MIN: ICD-10-PCS | Mod: GC,S$GLB,, | Performed by: STUDENT IN AN ORGANIZED HEALTH CARE EDUCATION/TRAINING PROGRAM

## 2022-05-13 PROCEDURE — 99999 PR PBB SHADOW E&M-EST. PATIENT-LVL II: ICD-10-PCS | Mod: PBBFAC,,, | Performed by: STUDENT IN AN ORGANIZED HEALTH CARE EDUCATION/TRAINING PROGRAM

## 2022-05-13 PROCEDURE — 76514 PR  US, EYE, FOR CORNEAL THICKNESS: ICD-10-PCS | Mod: S$GLB,,, | Performed by: STUDENT IN AN ORGANIZED HEALTH CARE EDUCATION/TRAINING PROGRAM

## 2022-05-13 PROCEDURE — 92020 PR SPECIAL EYE EVAL,GONIOSCOPY: ICD-10-PCS | Mod: S$GLB,,, | Performed by: STUDENT IN AN ORGANIZED HEALTH CARE EDUCATION/TRAINING PROGRAM

## 2022-05-13 PROCEDURE — 92133 CPTRZD OPH DX IMG PST SGM ON: CPT | Mod: S$GLB,,, | Performed by: STUDENT IN AN ORGANIZED HEALTH CARE EDUCATION/TRAINING PROGRAM

## 2022-05-13 NOTE — PROGRESS NOTES
Subjective:       Patient ID: Alejandrina Hernandez is a 41 y.o. female.    Chief Complaint: Glaucoma     HPI     Presents today for Glaucoma Evaluation--pt would like a second opinion.   Pt was diagnose with Glaucoma 2 years ago. Previously on glaucoma drops   but she stopped these 2 months ago. She is uncertain whether her diagnosis   was correct and wants a second opinion. Pt denies vision changes, eye pain   or discomfort. Pt reports that several months ago her A1c was 14% but now   it is down to 9%. She is on insulin.     HX of Laser OU x 2    MEDS:    Last edited by Kamila Ley MD on 5/13/2022 10:51 AM. (History)            Assessment & Plan   Glaucoma suspect of both eyes  -     Color Fundus Photography - OU - Both Eyes; Future  -     Posterior Segment OCT Optic Nerve- Both eyes; Future  -     Meyer Visual Field - OU - Extended - Both Eyes; Future    Nuclear sclerosis of both eyes    Moderate nonproliferative diabetic retinopathy of both eyes without macular edema associated with type 2 diabetes mellitus      GS OU  -Fhx (), Steroids (),Trauma()  -Drops: -   -Drop intolerance/contraindication: -  -Laser: SLT OU  -Surgeries:  -CCT: 541 // 535  -Gonio: SS with few areas of TM OU    Performed independent review of outside records including notes and tests  Discussed imaging and interpretation with patient.   Discussed eyedrops and if more than one, recommend 5 minutes between all drops.      5/22 HVF full OD // few nonspecific defects and rim artifact OS  5/22 RNFL Full OD // B N/TI OS    Monitor for now  IOP check 6 months - can be Nemoerbishnu  Repeat imaging x 1 year     DM2 with NPDR OU  Discussed  Tight BP/BS control   Recent improvement in A1c  Need Retina evaluation      PLAN  Retina 3 months    RTC with me 6 months IOP    Kamila Ley M.D., M.S.  Department of Ophthalmology   Division of Glaucoma Surgery  [facility]

## 2022-05-13 NOTE — PROGRESS NOTES
OCT DONE OU     24-2 LUIS FASTER DONE OU     REL & FIX =  GOOD OU     COOP =       GOOD     PATIENT DENIES ANY ALLERGIES TO LATEX OR ADHESIVES AT THIS TIME    JTHOMAS    NO MRX     USED AGE CORRECTION LENS FOR TEST

## 2022-06-07 RX ORDER — PEN NEEDLE, DIABETIC 31 GX5/16"
NEEDLE, DISPOSABLE MISCELLANEOUS
Qty: 150 EACH | Refills: 5 | Status: SHIPPED | OUTPATIENT
Start: 2022-06-07 | End: 2023-03-31 | Stop reason: SDUPTHER

## 2022-08-03 RX ORDER — FLASH GLUCOSE SENSOR
KIT MISCELLANEOUS
Qty: 2 KIT | Refills: 11 | Status: SHIPPED | OUTPATIENT
Start: 2022-08-03 | End: 2023-08-21 | Stop reason: SDUPTHER

## 2022-08-11 ENCOUNTER — OFFICE VISIT (OUTPATIENT)
Dept: OPHTHALMOLOGY | Facility: CLINIC | Age: 42
End: 2022-08-11
Payer: COMMERCIAL

## 2022-08-11 DIAGNOSIS — E11.3393 TYPE 2 DIABETES MELLITUS WITH BOTH EYES AFFECTED BY MODERATE NONPROLIFERATIVE RETINOPATHY WITHOUT MACULAR EDEMA, WITH LONG-TERM CURRENT USE OF INSULIN: Primary | ICD-10-CM

## 2022-08-11 DIAGNOSIS — Z79.4 TYPE 2 DIABETES MELLITUS WITH BOTH EYES AFFECTED BY MODERATE NONPROLIFERATIVE RETINOPATHY WITHOUT MACULAR EDEMA, WITH LONG-TERM CURRENT USE OF INSULIN: Primary | ICD-10-CM

## 2022-08-11 DIAGNOSIS — H40.003 GLAUCOMA SUSPECT OF BOTH EYES: ICD-10-CM

## 2022-08-11 DIAGNOSIS — E11.36 DIABETIC CATARACT OF BOTH EYES: ICD-10-CM

## 2022-08-11 PROCEDURE — 92014 PR EYE EXAM, EST PATIENT,COMPREHESV: ICD-10-PCS | Mod: S$GLB,,, | Performed by: OPHTHALMOLOGY

## 2022-08-11 PROCEDURE — 99999 PR PBB SHADOW E&M-EST. PATIENT-LVL II: CPT | Mod: PBBFAC,,, | Performed by: OPHTHALMOLOGY

## 2022-08-11 PROCEDURE — 3046F HEMOGLOBIN A1C LEVEL >9.0%: CPT | Mod: CPTII,S$GLB,, | Performed by: OPHTHALMOLOGY

## 2022-08-11 PROCEDURE — 92134 POSTERIOR SEGMENT OCT RETINA (OCULAR COHERENCE TOMOGRAPHY)-BOTH EYES: ICD-10-PCS | Mod: S$GLB,,, | Performed by: OPHTHALMOLOGY

## 2022-08-11 PROCEDURE — 1160F RVW MEDS BY RX/DR IN RCRD: CPT | Mod: CPTII,S$GLB,, | Performed by: OPHTHALMOLOGY

## 2022-08-11 PROCEDURE — 1159F MED LIST DOCD IN RCRD: CPT | Mod: CPTII,S$GLB,, | Performed by: OPHTHALMOLOGY

## 2022-08-11 PROCEDURE — 1159F PR MEDICATION LIST DOCUMENTED IN MEDICAL RECORD: ICD-10-PCS | Mod: CPTII,S$GLB,, | Performed by: OPHTHALMOLOGY

## 2022-08-11 PROCEDURE — 92014 COMPRE OPH EXAM EST PT 1/>: CPT | Mod: S$GLB,,, | Performed by: OPHTHALMOLOGY

## 2022-08-11 PROCEDURE — 1160F PR REVIEW ALL MEDS BY PRESCRIBER/CLIN PHARMACIST DOCUMENTED: ICD-10-PCS | Mod: CPTII,S$GLB,, | Performed by: OPHTHALMOLOGY

## 2022-08-11 PROCEDURE — 99999 PR PBB SHADOW E&M-EST. PATIENT-LVL II: ICD-10-PCS | Mod: PBBFAC,,, | Performed by: OPHTHALMOLOGY

## 2022-08-11 PROCEDURE — 3046F PR MOST RECENT HEMOGLOBIN A1C LEVEL > 9.0%: ICD-10-PCS | Mod: CPTII,S$GLB,, | Performed by: OPHTHALMOLOGY

## 2022-08-11 PROCEDURE — 92134 CPTRZ OPH DX IMG PST SGM RTA: CPT | Mod: S$GLB,,, | Performed by: OPHTHALMOLOGY

## 2022-08-12 NOTE — PROGRESS NOTES
"Subjective:       Patient ID: Alejandrina Hernandez is a 41 y.o. female      Chief Complaint   Patient presents with    Referral    Diabetic Eye Exam     History of Present Illness  HPI     NP Referral by Dr. Lye for the Treatment of NPDR OU w/o ME    DLS 05/13/2022 by Dr. Kamila Ley MD    Cc: pt states vision is blurry at times.    ++eye pain ( h/o sinus)  ++blurred VA OU  --diplopia  --headaches  --veils/curtains/shadows    Eye Meds: NONE    DM x "few years"  Pt not sure how long    POHx  1. Glaucoma Suspect OU  2. HX of Laser OU x 2   3. Type II DM w/ Mod. NPDR w/o ME    Last edited by Bobo Lion MD on 8/12/2022  4:57 PM. (History)        Imaging:    See report    Assessment/Plan:     1. Type 2 diabetes mellitus with both eyes affected by moderate nonproliferative retinopathy without macular edema, with long-term current use of insulin  Diabetic Retinopathy discussed in detail, all questions answered  Stressed importance of good BS/BP/Chol Control  RTC immediately PRN any vision changes, hugh blurry vision, missing vision, floaters, distortions, etc    - OCT- Retina; Future  - Posterior Segment OCT Retina-Both eyes    2. Glaucoma suspect of both eyes  Keep f/u with Dr Ley    3. Diabetic cataract of both eyes  Excellent pinhole vision  Refraction    Follow up in about 6 months (around 2/11/2023), or if symptoms worsen or fail to improve, for Comprehensive Examination, OCT Mac.     "

## 2022-10-03 DIAGNOSIS — Z79.4 TYPE 2 DIABETES MELLITUS WITH MILD NONPROLIFERATIVE RETINOPATHY OF RIGHT EYE, WITH LONG-TERM CURRENT USE OF INSULIN, MACULAR EDEMA PRESENCE UNSPECIFIED: ICD-10-CM

## 2022-10-03 DIAGNOSIS — E11.3291 TYPE 2 DIABETES MELLITUS WITH MILD NONPROLIFERATIVE RETINOPATHY OF RIGHT EYE, WITH LONG-TERM CURRENT USE OF INSULIN, MACULAR EDEMA PRESENCE UNSPECIFIED: ICD-10-CM

## 2022-10-03 RX ORDER — INSULIN GLARGINE 300 [IU]/ML
60 INJECTION, SOLUTION SUBCUTANEOUS NIGHTLY
Qty: 4 PEN | Refills: 11 | Status: SHIPPED | OUTPATIENT
Start: 2022-10-03 | End: 2023-05-16

## 2022-10-03 NOTE — TELEPHONE ENCOUNTER
----- Message from Abiola Meza sent at 10/3/2022 11:49 AM CDT -----  Contact: self  Alejandrina Hernandez  MRN: [id number]  : 1980  PCP: Luciano Clark  Home Phone      [phone]  Work Phone      Not on file.  [employer]          [phone]      MESSAGE: Refill on  [facility]       Phone  [phone]

## 2022-10-05 ENCOUNTER — OFFICE VISIT (OUTPATIENT)
Dept: ENDOCRINOLOGY | Facility: CLINIC | Age: 42
End: 2022-10-05
Payer: COMMERCIAL

## 2022-10-05 VITALS
HEART RATE: 82 BPM | HEIGHT: 66 IN | BODY MASS INDEX: 39.69 KG/M2 | SYSTOLIC BLOOD PRESSURE: 136 MMHG | WEIGHT: 246.94 LBS | DIASTOLIC BLOOD PRESSURE: 82 MMHG | RESPIRATION RATE: 16 BRPM

## 2022-10-05 DIAGNOSIS — E11.3291 TYPE 2 DIABETES MELLITUS WITH MILD NONPROLIFERATIVE RETINOPATHY OF RIGHT EYE, WITH LONG-TERM CURRENT USE OF INSULIN, MACULAR EDEMA PRESENCE UNSPECIFIED: Primary | ICD-10-CM

## 2022-10-05 DIAGNOSIS — E03.4 HYPOTHYROIDISM DUE TO ACQUIRED ATROPHY OF THYROID: ICD-10-CM

## 2022-10-05 DIAGNOSIS — E78.2 MIXED HYPERLIPIDEMIA: ICD-10-CM

## 2022-10-05 DIAGNOSIS — Z79.4 TYPE 2 DIABETES MELLITUS WITH MILD NONPROLIFERATIVE RETINOPATHY OF RIGHT EYE, WITH LONG-TERM CURRENT USE OF INSULIN, MACULAR EDEMA PRESENCE UNSPECIFIED: Primary | ICD-10-CM

## 2022-10-05 PROCEDURE — 3079F PR MOST RECENT DIASTOLIC BLOOD PRESSURE 80-89 MM HG: ICD-10-PCS | Mod: CPTII,S$GLB,, | Performed by: STUDENT IN AN ORGANIZED HEALTH CARE EDUCATION/TRAINING PROGRAM

## 2022-10-05 PROCEDURE — 3051F HG A1C>EQUAL 7.0%<8.0%: CPT | Mod: CPTII,S$GLB,, | Performed by: STUDENT IN AN ORGANIZED HEALTH CARE EDUCATION/TRAINING PROGRAM

## 2022-10-05 PROCEDURE — 95251 CONT GLUC MNTR ANALYSIS I&R: CPT | Mod: S$GLB,,, | Performed by: STUDENT IN AN ORGANIZED HEALTH CARE EDUCATION/TRAINING PROGRAM

## 2022-10-05 PROCEDURE — 3079F DIAST BP 80-89 MM HG: CPT | Mod: CPTII,S$GLB,, | Performed by: STUDENT IN AN ORGANIZED HEALTH CARE EDUCATION/TRAINING PROGRAM

## 2022-10-05 PROCEDURE — 3066F PR DOCUMENTATION OF TREATMENT FOR NEPHROPATHY: ICD-10-PCS | Mod: CPTII,S$GLB,, | Performed by: STUDENT IN AN ORGANIZED HEALTH CARE EDUCATION/TRAINING PROGRAM

## 2022-10-05 PROCEDURE — 3008F BODY MASS INDEX DOCD: CPT | Mod: CPTII,S$GLB,, | Performed by: STUDENT IN AN ORGANIZED HEALTH CARE EDUCATION/TRAINING PROGRAM

## 2022-10-05 PROCEDURE — 3066F NEPHROPATHY DOC TX: CPT | Mod: CPTII,S$GLB,, | Performed by: STUDENT IN AN ORGANIZED HEALTH CARE EDUCATION/TRAINING PROGRAM

## 2022-10-05 PROCEDURE — 99214 OFFICE O/P EST MOD 30 MIN: CPT | Mod: 25,S$GLB,, | Performed by: STUDENT IN AN ORGANIZED HEALTH CARE EDUCATION/TRAINING PROGRAM

## 2022-10-05 PROCEDURE — 99999 PR PBB SHADOW E&M-EST. PATIENT-LVL V: CPT | Mod: PBBFAC,,, | Performed by: STUDENT IN AN ORGANIZED HEALTH CARE EDUCATION/TRAINING PROGRAM

## 2022-10-05 PROCEDURE — 3051F PR MOST RECENT HEMOGLOBIN A1C LEVEL 7.0 - < 8.0%: ICD-10-PCS | Mod: CPTII,S$GLB,, | Performed by: STUDENT IN AN ORGANIZED HEALTH CARE EDUCATION/TRAINING PROGRAM

## 2022-10-05 PROCEDURE — 3008F PR BODY MASS INDEX (BMI) DOCUMENTED: ICD-10-PCS | Mod: CPTII,S$GLB,, | Performed by: STUDENT IN AN ORGANIZED HEALTH CARE EDUCATION/TRAINING PROGRAM

## 2022-10-05 PROCEDURE — 1159F PR MEDICATION LIST DOCUMENTED IN MEDICAL RECORD: ICD-10-PCS | Mod: CPTII,S$GLB,, | Performed by: STUDENT IN AN ORGANIZED HEALTH CARE EDUCATION/TRAINING PROGRAM

## 2022-10-05 PROCEDURE — 95251 PR GLUCOSE MONITOR, 72 HOUR, PHYS INTERP: ICD-10-PCS | Mod: S$GLB,,, | Performed by: STUDENT IN AN ORGANIZED HEALTH CARE EDUCATION/TRAINING PROGRAM

## 2022-10-05 PROCEDURE — 99999 PR PBB SHADOW E&M-EST. PATIENT-LVL V: ICD-10-PCS | Mod: PBBFAC,,, | Performed by: STUDENT IN AN ORGANIZED HEALTH CARE EDUCATION/TRAINING PROGRAM

## 2022-10-05 PROCEDURE — 3060F POS MICROALBUMINURIA REV: CPT | Mod: CPTII,S$GLB,, | Performed by: STUDENT IN AN ORGANIZED HEALTH CARE EDUCATION/TRAINING PROGRAM

## 2022-10-05 PROCEDURE — 3060F PR POS MICROALBUMINURIA RESULT DOCUMENTED/REVIEW: ICD-10-PCS | Mod: CPTII,S$GLB,, | Performed by: STUDENT IN AN ORGANIZED HEALTH CARE EDUCATION/TRAINING PROGRAM

## 2022-10-05 PROCEDURE — 3075F SYST BP GE 130 - 139MM HG: CPT | Mod: CPTII,S$GLB,, | Performed by: STUDENT IN AN ORGANIZED HEALTH CARE EDUCATION/TRAINING PROGRAM

## 2022-10-05 PROCEDURE — 99214 PR OFFICE/OUTPT VISIT, EST, LEVL IV, 30-39 MIN: ICD-10-PCS | Mod: 25,S$GLB,, | Performed by: STUDENT IN AN ORGANIZED HEALTH CARE EDUCATION/TRAINING PROGRAM

## 2022-10-05 PROCEDURE — 3075F PR MOST RECENT SYSTOLIC BLOOD PRESS GE 130-139MM HG: ICD-10-PCS | Mod: CPTII,S$GLB,, | Performed by: STUDENT IN AN ORGANIZED HEALTH CARE EDUCATION/TRAINING PROGRAM

## 2022-10-05 PROCEDURE — 1159F MED LIST DOCD IN RCRD: CPT | Mod: CPTII,S$GLB,, | Performed by: STUDENT IN AN ORGANIZED HEALTH CARE EDUCATION/TRAINING PROGRAM

## 2022-10-05 RX ORDER — PREGABALIN 75 MG/1
CAPSULE ORAL
COMMUNITY
Start: 2022-04-14 | End: 2024-02-16 | Stop reason: SDUPTHER

## 2022-10-05 RX ORDER — AMLODIPINE BESYLATE 5 MG/1
5 TABLET ORAL DAILY
COMMUNITY
Start: 2022-09-28

## 2022-10-05 RX ORDER — MELOXICAM 15 MG/1
15 TABLET ORAL DAILY PRN
COMMUNITY
Start: 2022-09-24 | End: 2023-06-19 | Stop reason: HOSPADM

## 2022-10-05 RX ORDER — INSULIN GLARGINE 100 [IU]/ML
INJECTION, SOLUTION SUBCUTANEOUS
COMMUNITY
Start: 2022-08-05 | End: 2023-02-08 | Stop reason: ALTCHOICE

## 2022-10-05 NOTE — ASSESSMENT & PLAN NOTE
Control has improved with avg glucose down from 250 to 180 She is unable to tolerate higher metformin doses. She dose not wish to go back on SGLT-2i given recurrent genital infections in the past. She is tolerating GLP-1 RA and basal bolus insulin.    Her A1c is 7.5% which is the best it has been in years. Will continue same regimen.    Plan  - Continue Toujeo 60 U HS  - Continue Humalog 20 U AC + SSI  - Continue metformin 500 to 1,000 mg daily (sometimes takes 1 if having diarrhea)  - Continue Trulicity 3 mg SQ weekly  - Continue FreeStyle Maritza 2 CGM    F/u 3 months with labs

## 2022-10-05 NOTE — PROGRESS NOTES
Subjective:      Patient ID: Alejandrina Hernandez is a 41 y.o. female.    Chief Complaint:  Type 2 diabetes    History of Present Illness  This is a 41 y.o. female. with a past medical history of DM2, HLD, HTN, obesity here for DM2.    Type 2 diabetes mellitus  Diagnosed in late 20s - had frequent miscarriages which prompted workup    Current diabetic medications:  - Toujeo 60 U HS   - Humalog 20 U before meals + sliding scale  - Metformin  - has diarrhea with higher dose  - Trulicity 3 mg SQ weekly    Past diabetes medications:  - Metformin - diarrhea  - Jardiance - frequent yeast infections - persistent even after Diflucan     Known diabetic complications: retinopathy    Weight trend: stable  Prior visit with diabetes education: yes    Current diet: 2 meals usually, sometimes 3  Current exercise: Limited with back pain      Wt Readings from Last 5 Encounters:   10/05/22 112 kg (246 lb 14.6 oz)   09/10/22 112.5 kg (248 lb)   05/04/22 112.8 kg (248 lb 10.9 oz)   03/04/22 114.8 kg (253 lb 1.4 oz)   01/31/22 114.8 kg (253 lb 1.4 oz)           Mother DM2  Maternal grandfather DM2  Paternal grandmother DM2  A lot of cousins and aunts with DM2    Diabetes Management Status  Statin: Taking  ACE/ARB: Not taking - was on ACEi - cough    Screening or Prevention Patient's value   HgA1C Testing and Control   Lab Results   Component Value Date    HGBA1C 7.5 (H) 08/24/2022      Lipid profile : 08/24/2022   LDL control Lab Results   Component Value Date    LDLCALC 102.8 08/24/2022      Nephropathy screening Lab Results   Component Value Date    MICALBCREAT 95.9 (H) 08/24/2022      Blood pressure BP Readings from Last 1 Encounters:   10/05/22 136/82      Dilated retinal exam : 08/11/2022   Foot exam : 10/15/2021       Hypothyroidism  Currently on levothyroxine 125 mcg daily  Reports takes 30 min before eating or drinking anything other than water.     Lab Results   Component Value Date    TSH 4.381 (H) 08/24/2022  "        Review of Systems  As above    Social and family history reviewed  Current medications and allergies reviewed    Objective:   /82 (BP Location: Right arm, Patient Position: Sitting, BP Method: Large (Manual))   Pulse 82   Resp 16   Ht 5' 6" (1.676 m)   Wt 112 kg (246 lb 14.6 oz)   BMI 39.85 kg/m²   Physical Exam   Some areas of lipodystrophy in abdominal region      BP Readings from Last 1 Encounters:   10/05/22 136/82      Wt Readings from Last 1 Encounters:   10/05/22 0952 112 kg (246 lb 14.6 oz)     Body mass index is 39.85 kg/m².    Lab Review:   Lab Results   Component Value Date    HGBA1C 7.5 (H) 08/24/2022     Lab Results   Component Value Date    CHOL 171 08/24/2022    HDL 43 08/24/2022    LDLCALC 102.8 08/24/2022    TRIG 126 08/24/2022    CHOLHDL 25.1 08/24/2022     Lab Results   Component Value Date     08/24/2022    K 4.2 08/24/2022     08/24/2022    CO2 22 (L) 08/24/2022     (H) 08/24/2022    BUN 10 08/24/2022    CREATININE 0.7 08/24/2022    CALCIUM 9.3 08/24/2022    PROT 7.3 01/28/2022    ALBUMIN 4.1 01/28/2022    BILITOT 0.8 01/28/2022    ALKPHOS 43 (L) 01/28/2022    AST 15 01/28/2022    ALT 27 01/28/2022    ANIONGAP 12 08/24/2022    ESTGFRAFRICA >60.0 01/28/2022    EGFRNONAA >60.0 01/28/2022    TSH 4.381 (H) 08/24/2022       All pertinent labs reviewed    Assessment and Plan     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 She dose not wish to go back on SGLT-2i given recurrent genital infections in the past. She is tolerating GLP-1 RA and basal bolus insulin.    Her A1c is 7.5% which is the best it has been in years. Will continue same regimen.    Plan  - Continue Toujeo 60 U HS  - Continue Humalog 20 U AC + SSI  - Continue metformin 500 to 1,000 mg daily (sometimes takes 1 if having " diarrhea)  - Continue Trulicity 3 mg SQ weekly  - Continue FreeStyle Maritza 2 CGM    F/u 3 months with labs    Hypothyroidism due to acquired atrophy of thyroid  Last TSH at goal for age  Continue levothyroxine 125 mcg daily    Mixed hyperlipidemia  Continue statin    F/u 3 months    Mendez Roberts MD  Endocrinology

## 2022-12-05 DIAGNOSIS — Z79.4 TYPE 2 DIABETES MELLITUS WITH MILD NONPROLIFERATIVE RETINOPATHY OF RIGHT EYE, WITH LONG-TERM CURRENT USE OF INSULIN, MACULAR EDEMA PRESENCE UNSPECIFIED: ICD-10-CM

## 2022-12-05 DIAGNOSIS — E11.3291 TYPE 2 DIABETES MELLITUS WITH MILD NONPROLIFERATIVE RETINOPATHY OF RIGHT EYE, WITH LONG-TERM CURRENT USE OF INSULIN, MACULAR EDEMA PRESENCE UNSPECIFIED: ICD-10-CM

## 2022-12-05 RX ORDER — DULAGLUTIDE 3 MG/.5ML
3 INJECTION, SOLUTION SUBCUTANEOUS
Qty: 4 PEN | Refills: 11 | Status: SHIPPED | OUTPATIENT
Start: 2022-12-05 | End: 2023-01-20

## 2023-01-10 ENCOUNTER — TELEPHONE (OUTPATIENT)
Dept: ENDOCRINOLOGY | Facility: CLINIC | Age: 43
End: 2023-01-10
Payer: COMMERCIAL

## 2023-01-10 DIAGNOSIS — E11.3291 TYPE 2 DIABETES MELLITUS WITH MILD NONPROLIFERATIVE RETINOPATHY OF RIGHT EYE, WITH LONG-TERM CURRENT USE OF INSULIN, MACULAR EDEMA PRESENCE UNSPECIFIED: ICD-10-CM

## 2023-01-10 DIAGNOSIS — Z79.4 TYPE 2 DIABETES MELLITUS WITH MILD NONPROLIFERATIVE RETINOPATHY OF RIGHT EYE, WITH LONG-TERM CURRENT USE OF INSULIN, MACULAR EDEMA PRESENCE UNSPECIFIED: ICD-10-CM

## 2023-01-10 RX ORDER — METFORMIN HYDROCHLORIDE 500 MG/1
500 TABLET, EXTENDED RELEASE ORAL 2 TIMES DAILY WITH MEALS
Qty: 180 TABLET | Refills: 3 | Status: SHIPPED | OUTPATIENT
Start: 2023-01-10 | End: 2023-01-20

## 2023-01-20 ENCOUNTER — LAB VISIT (OUTPATIENT)
Dept: LAB | Facility: HOSPITAL | Age: 43
End: 2023-01-20
Attending: STUDENT IN AN ORGANIZED HEALTH CARE EDUCATION/TRAINING PROGRAM
Payer: COMMERCIAL

## 2023-01-20 ENCOUNTER — OFFICE VISIT (OUTPATIENT)
Dept: ENDOCRINOLOGY | Facility: CLINIC | Age: 43
End: 2023-01-20
Payer: COMMERCIAL

## 2023-01-20 VITALS
HEIGHT: 66 IN | HEART RATE: 87 BPM | WEIGHT: 251.31 LBS | RESPIRATION RATE: 16 BRPM | DIASTOLIC BLOOD PRESSURE: 80 MMHG | BODY MASS INDEX: 40.39 KG/M2 | SYSTOLIC BLOOD PRESSURE: 133 MMHG

## 2023-01-20 DIAGNOSIS — E78.2 MIXED HYPERLIPIDEMIA: ICD-10-CM

## 2023-01-20 DIAGNOSIS — Z79.4 TYPE 2 DIABETES MELLITUS WITH MILD NONPROLIFERATIVE RETINOPATHY OF RIGHT EYE, WITH LONG-TERM CURRENT USE OF INSULIN, MACULAR EDEMA PRESENCE UNSPECIFIED: ICD-10-CM

## 2023-01-20 DIAGNOSIS — E11.3291 TYPE 2 DIABETES MELLITUS WITH MILD NONPROLIFERATIVE RETINOPATHY OF RIGHT EYE, WITH LONG-TERM CURRENT USE OF INSULIN, MACULAR EDEMA PRESENCE UNSPECIFIED: ICD-10-CM

## 2023-01-20 DIAGNOSIS — E11.3291 TYPE 2 DIABETES MELLITUS WITH MILD NONPROLIFERATIVE RETINOPATHY OF RIGHT EYE, WITH LONG-TERM CURRENT USE OF INSULIN, MACULAR EDEMA PRESENCE UNSPECIFIED: Primary | ICD-10-CM

## 2023-01-20 DIAGNOSIS — Z79.4 TYPE 2 DIABETES MELLITUS WITH MILD NONPROLIFERATIVE RETINOPATHY OF RIGHT EYE, WITH LONG-TERM CURRENT USE OF INSULIN, MACULAR EDEMA PRESENCE UNSPECIFIED: Primary | ICD-10-CM

## 2023-01-20 DIAGNOSIS — E03.4 HYPOTHYROIDISM DUE TO ACQUIRED ATROPHY OF THYROID: ICD-10-CM

## 2023-01-20 LAB
ANION GAP SERPL CALC-SCNC: 10 MMOL/L (ref 8–16)
BUN SERPL-MCNC: 9 MG/DL (ref 6–20)
CALCIUM SERPL-MCNC: 9.4 MG/DL (ref 8.7–10.5)
CHLORIDE SERPL-SCNC: 103 MMOL/L (ref 95–110)
CO2 SERPL-SCNC: 24 MMOL/L (ref 23–29)
CREAT SERPL-MCNC: 0.8 MG/DL (ref 0.5–1.4)
EST. GFR  (NO RACE VARIABLE): >60 ML/MIN/1.73 M^2
ESTIMATED AVG GLUCOSE: 206 MG/DL (ref 68–131)
GLUCOSE SERPL-MCNC: 310 MG/DL (ref 70–110)
HBA1C MFR BLD: 8.8 % (ref 4–5.6)
POTASSIUM SERPL-SCNC: 3.9 MMOL/L (ref 3.5–5.1)
SODIUM SERPL-SCNC: 137 MMOL/L (ref 136–145)
TSH SERPL DL<=0.005 MIU/L-ACNC: 2.2 UIU/ML (ref 0.4–4)

## 2023-01-20 PROCEDURE — 99214 PR OFFICE/OUTPT VISIT, EST, LEVL IV, 30-39 MIN: ICD-10-PCS | Mod: 25,S$GLB,, | Performed by: STUDENT IN AN ORGANIZED HEALTH CARE EDUCATION/TRAINING PROGRAM

## 2023-01-20 PROCEDURE — 83036 HEMOGLOBIN GLYCOSYLATED A1C: CPT | Performed by: STUDENT IN AN ORGANIZED HEALTH CARE EDUCATION/TRAINING PROGRAM

## 2023-01-20 PROCEDURE — 3075F PR MOST RECENT SYSTOLIC BLOOD PRESS GE 130-139MM HG: ICD-10-PCS | Mod: CPTII,S$GLB,, | Performed by: STUDENT IN AN ORGANIZED HEALTH CARE EDUCATION/TRAINING PROGRAM

## 2023-01-20 PROCEDURE — 1160F PR REVIEW ALL MEDS BY PRESCRIBER/CLIN PHARMACIST DOCUMENTED: ICD-10-PCS | Mod: CPTII,S$GLB,, | Performed by: STUDENT IN AN ORGANIZED HEALTH CARE EDUCATION/TRAINING PROGRAM

## 2023-01-20 PROCEDURE — 36415 COLL VENOUS BLD VENIPUNCTURE: CPT | Performed by: STUDENT IN AN ORGANIZED HEALTH CARE EDUCATION/TRAINING PROGRAM

## 2023-01-20 PROCEDURE — 1159F PR MEDICATION LIST DOCUMENTED IN MEDICAL RECORD: ICD-10-PCS | Mod: CPTII,S$GLB,, | Performed by: STUDENT IN AN ORGANIZED HEALTH CARE EDUCATION/TRAINING PROGRAM

## 2023-01-20 PROCEDURE — 3075F SYST BP GE 130 - 139MM HG: CPT | Mod: CPTII,S$GLB,, | Performed by: STUDENT IN AN ORGANIZED HEALTH CARE EDUCATION/TRAINING PROGRAM

## 2023-01-20 PROCEDURE — 99999 PR PBB SHADOW E&M-EST. PATIENT-LVL III: ICD-10-PCS | Mod: PBBFAC,,, | Performed by: STUDENT IN AN ORGANIZED HEALTH CARE EDUCATION/TRAINING PROGRAM

## 2023-01-20 PROCEDURE — 84443 ASSAY THYROID STIM HORMONE: CPT | Performed by: STUDENT IN AN ORGANIZED HEALTH CARE EDUCATION/TRAINING PROGRAM

## 2023-01-20 PROCEDURE — 3008F PR BODY MASS INDEX (BMI) DOCUMENTED: ICD-10-PCS | Mod: CPTII,S$GLB,, | Performed by: STUDENT IN AN ORGANIZED HEALTH CARE EDUCATION/TRAINING PROGRAM

## 2023-01-20 PROCEDURE — 3079F DIAST BP 80-89 MM HG: CPT | Mod: CPTII,S$GLB,, | Performed by: STUDENT IN AN ORGANIZED HEALTH CARE EDUCATION/TRAINING PROGRAM

## 2023-01-20 PROCEDURE — 1159F MED LIST DOCD IN RCRD: CPT | Mod: CPTII,S$GLB,, | Performed by: STUDENT IN AN ORGANIZED HEALTH CARE EDUCATION/TRAINING PROGRAM

## 2023-01-20 PROCEDURE — 95251 PR GLUCOSE MONITOR, 72 HOUR, PHYS INTERP: ICD-10-PCS | Mod: S$GLB,,, | Performed by: STUDENT IN AN ORGANIZED HEALTH CARE EDUCATION/TRAINING PROGRAM

## 2023-01-20 PROCEDURE — 80048 BASIC METABOLIC PNL TOTAL CA: CPT | Performed by: STUDENT IN AN ORGANIZED HEALTH CARE EDUCATION/TRAINING PROGRAM

## 2023-01-20 PROCEDURE — 3008F BODY MASS INDEX DOCD: CPT | Mod: CPTII,S$GLB,, | Performed by: STUDENT IN AN ORGANIZED HEALTH CARE EDUCATION/TRAINING PROGRAM

## 2023-01-20 PROCEDURE — 95251 CONT GLUC MNTR ANALYSIS I&R: CPT | Mod: S$GLB,,, | Performed by: STUDENT IN AN ORGANIZED HEALTH CARE EDUCATION/TRAINING PROGRAM

## 2023-01-20 PROCEDURE — 1160F RVW MEDS BY RX/DR IN RCRD: CPT | Mod: CPTII,S$GLB,, | Performed by: STUDENT IN AN ORGANIZED HEALTH CARE EDUCATION/TRAINING PROGRAM

## 2023-01-20 PROCEDURE — 3079F PR MOST RECENT DIASTOLIC BLOOD PRESSURE 80-89 MM HG: ICD-10-PCS | Mod: CPTII,S$GLB,, | Performed by: STUDENT IN AN ORGANIZED HEALTH CARE EDUCATION/TRAINING PROGRAM

## 2023-01-20 PROCEDURE — 99214 OFFICE O/P EST MOD 30 MIN: CPT | Mod: 25,S$GLB,, | Performed by: STUDENT IN AN ORGANIZED HEALTH CARE EDUCATION/TRAINING PROGRAM

## 2023-01-20 PROCEDURE — 99999 PR PBB SHADOW E&M-EST. PATIENT-LVL III: CPT | Mod: PBBFAC,,, | Performed by: STUDENT IN AN ORGANIZED HEALTH CARE EDUCATION/TRAINING PROGRAM

## 2023-01-20 RX ORDER — CYCLOBENZAPRINE HCL 5 MG
1 TABLET ORAL
COMMUNITY
End: 2023-05-16

## 2023-01-20 RX ORDER — SEMAGLUTIDE 1.34 MG/ML
1 INJECTION, SOLUTION SUBCUTANEOUS
Qty: 1 PEN | Refills: 11 | Status: SHIPPED | OUTPATIENT
Start: 2023-01-20 | End: 2023-05-11

## 2023-01-20 NOTE — ASSESSMENT & PLAN NOTE
Control has worsened slightly, BMI 8.1%. Reports dietary indiscretion - wants to talk to CDE.    She dose not wish to go back on SGLT-2i given recurrent genital infections in the past. Unable to tolerate metformin recently due to diarrhea/viral illness.    She is tolerating GLP-1 RA but will switch to Ozempic to see if we get better results with weight.    Plan  - Continue Toujeo 65 U HS  - Continue Humalog 20 U AC + SSI  - Stop Trulicity  - Start Ozempic 1 mg weekly  - Continue FreeStyle Maritza 2 CGM    CDE referral    Labs today    F/u 4 months

## 2023-01-20 NOTE — PROGRESS NOTES
"Subjective:      Patient ID: Alejandrina Hernandez is a 42 y.o. female.    Chief Complaint:  Type 2 diabetes    History of Present Illness  This is a 42 y.o. female. with a past medical history of DM2, HLD, HTN, obesity here for DM2.    Type 2 diabetes mellitus  Diagnosed in late 20s    Current diabetic medications:  - Toujeo 65 U HS   - Humalog 20 U before meals + sliding scale  - Trulicity 3 mg SQ weekly    Past diabetes medications:  - Metformin - diarrhea  - Jardiance - frequent yeast infections - persistent even after Diflucan     Known diabetic complications: retinopathy    Weight trend: stable  Prior visit with diabetes education: yes    Current diet: 2 meals usually, sometimes 3  Current exercise: Limited with back pain    Wt Readings from Last 6 Encounters:   01/20/23 114 kg (251 lb 5.2 oz)   11/16/22 111.1 kg (245 lb)   10/21/22 114.5 kg (252 lb 6 oz)   10/05/22 112 kg (246 lb 14.6 oz)   09/10/22 112.5 kg (248 lb)   05/04/22 112.8 kg (248 lb 10.9 oz)           Mother DM2  Maternal grandfather DM2  Paternal grandmother DM2  A lot of cousins and aunts with DM2    Diabetes Management Status  Statin: Taking  ACE/ARB: Not taking    Screening or Prevention Patient's value   HgA1C Testing and Control   Lab Results   Component Value Date    HGBA1C 7.5 (H) 08/24/2022        LDL control Lab Results   Component Value Date    LDLCALC 102.8 08/24/2022      Nephropathy screening Lab Results   Component Value Date    MICALBCREAT 95.9 (H) 08/24/2022              Hypothyroidism  Currently on levothyroxine 125 mcg daily  Reports takes 30 min before eating or drinking anything other than water.     Lab Results   Component Value Date    TSH 4.381 (H) 08/24/2022         Review of Systems  As above    Social and family history reviewed  Current medications and allergies reviewed    Objective:   /80 (BP Location: Right arm, Patient Position: Sitting, BP Method: Medium (Manual))   Pulse 87   Resp 16   Ht 5' 6" (1.676 m)   " Wt 114 kg (251 lb 5.2 oz)   BMI 40.56 kg/m²   Physical Exam   Some areas of lipodystrophy in abdominal region      BP Readings from Last 1 Encounters:   01/20/23 133/80      Wt Readings from Last 1 Encounters:   01/20/23 0921 114 kg (251 lb 5.2 oz)     Body mass index is 40.56 kg/m².    Lab Review:   Lab Results   Component Value Date    HGBA1C 7.5 (H) 08/24/2022     Lab Results   Component Value Date    CHOL 171 08/24/2022    HDL 43 08/24/2022    LDLCALC 102.8 08/24/2022    TRIG 126 08/24/2022    CHOLHDL 25.1 08/24/2022     Lab Results   Component Value Date     08/24/2022    K 4.2 08/24/2022     08/24/2022    CO2 22 (L) 08/24/2022     (H) 08/24/2022    BUN 10 08/24/2022    CREATININE 0.7 08/24/2022    CALCIUM 9.3 08/24/2022    PROT 7.3 01/28/2022    ALBUMIN 4.1 01/28/2022    BILITOT 0.8 01/28/2022    ALKPHOS 43 (L) 01/28/2022    AST 15 01/28/2022    ALT 27 01/28/2022    ANIONGAP 12 08/24/2022    ESTGFRAFRICA >60.0 01/28/2022    EGFRNONAA >60.0 01/28/2022    TSH 4.381 (H) 08/24/2022       All pertinent labs reviewed    Assessment and Plan     Type 2 diabetes mellitus with mild nonproliferative retinopathy of right eye, with long-term current use of insulin  Control has worsened slightly, BMI 8.1%. Reports dietary indiscretion - wants to talk to CDE.    She dose not wish to go back on SGLT-2i given recurrent genital infections in the past. Unable to tolerate metformin recently due to diarrhea/viral illness.    She is tolerating GLP-1 RA but will switch to Ozempic to see if we get better results with weight.    Plan  - Continue Toujeo 65 U HS  - Continue Humalog 20 U AC + SSI  - Stop Trulicity  - Start Ozempic 1 mg weekly  - Continue FreeStyle Maritza 2 CGM    CDE referral    Labs today    F/u 4 months    Hypothyroidism due to acquired atrophy of thyroid  Last TSH at goal for age  Continue levothyroxine 125 mcg daily    Recheck TSH    Mixed hyperlipidemia  Continue statin      F/u 4 months    Mendez  TEA Roberts MD  Endocrinology

## 2023-01-23 ENCOUNTER — TELEPHONE (OUTPATIENT)
Dept: ENDOCRINOLOGY | Facility: CLINIC | Age: 43
End: 2023-01-23
Payer: COMMERCIAL

## 2023-01-23 NOTE — TELEPHONE ENCOUNTER
Patient notified of results, verbalized understanding.      ----- Message from Mendez Roberts MD sent at 1/23/2023 11:04 AM CST -----  Please inform that her A1c went to 8.8 so she needs to go back to eating healthier (discussed during visit). Her thyroid levels are good - continue levothyroxine 125 mcg daily.

## 2023-01-23 NOTE — PROGRESS NOTES
Subjective:       Patient ID: Alejandrina Hernandez is a 42 y.o. female.    Chief Complaint: Glaucoma    HPI    Pt is here today for IOP Check.  She states that her vision is stable since her last visit. Every now and   then she does get a quick, sharp pain OU that lasts for a few seconds.    No current eye meds.  Last edited by Dario Yin on 1/24/2023 10:31 AM.                Assessment & Plan   Glaucoma suspect of both eyes    Diabetic cataract of both eyes    Type 2 diabetes mellitus with both eyes affected by moderate nonproliferative retinopathy without macular edema, with long-term current use of insulin         GS OU  -Fhx (+), Steroids (),Trauma()  -Drops: -   -Drop intolerance/contraindication: -  -Laser: SLT OU (1x per each eye Dr TOLEDO 2-3 years ago)  -Surgeries:  -CCT: 541 // 535  -Gonio: SS with few areas of TM OU     5/22 HVF full OD // few nonspecific defects and rim artifact OS  5/22 RNFL Full OD // B N/TI OS    Monitor for now  IOP check 6 months - can be [place]  Imaging due ~5/2023    DM2 with moderate NPDR OU  Dr. Ayad STEVENSON scheduled 2/2023      RTC with me 6 months HVF 24-2, OCT-RNFL      Kamila Ley M.D., M.S.  Department of Ophthalmology   Division of Glaucoma Surgery  [facility]

## 2023-01-24 ENCOUNTER — OFFICE VISIT (OUTPATIENT)
Dept: OPHTHALMOLOGY | Facility: CLINIC | Age: 43
End: 2023-01-24
Payer: COMMERCIAL

## 2023-01-24 DIAGNOSIS — H40.003 GLAUCOMA SUSPECT OF BOTH EYES: Primary | ICD-10-CM

## 2023-01-24 DIAGNOSIS — Z79.4 TYPE 2 DIABETES MELLITUS WITH BOTH EYES AFFECTED BY MODERATE NONPROLIFERATIVE RETINOPATHY WITHOUT MACULAR EDEMA, WITH LONG-TERM CURRENT USE OF INSULIN: ICD-10-CM

## 2023-01-24 DIAGNOSIS — E11.3393 TYPE 2 DIABETES MELLITUS WITH BOTH EYES AFFECTED BY MODERATE NONPROLIFERATIVE RETINOPATHY WITHOUT MACULAR EDEMA, WITH LONG-TERM CURRENT USE OF INSULIN: ICD-10-CM

## 2023-01-24 DIAGNOSIS — E11.36 DIABETIC CATARACT OF BOTH EYES: ICD-10-CM

## 2023-01-24 PROCEDURE — 99999 PR PBB SHADOW E&M-EST. PATIENT-LVL II: ICD-10-PCS | Mod: PBBFAC,,, | Performed by: STUDENT IN AN ORGANIZED HEALTH CARE EDUCATION/TRAINING PROGRAM

## 2023-01-24 PROCEDURE — 1159F MED LIST DOCD IN RCRD: CPT | Mod: CPTII,S$GLB,, | Performed by: STUDENT IN AN ORGANIZED HEALTH CARE EDUCATION/TRAINING PROGRAM

## 2023-01-24 PROCEDURE — 3052F HG A1C>EQUAL 8.0%<EQUAL 9.0%: CPT | Mod: CPTII,S$GLB,, | Performed by: STUDENT IN AN ORGANIZED HEALTH CARE EDUCATION/TRAINING PROGRAM

## 2023-01-24 PROCEDURE — 1159F PR MEDICATION LIST DOCUMENTED IN MEDICAL RECORD: ICD-10-PCS | Mod: CPTII,S$GLB,, | Performed by: STUDENT IN AN ORGANIZED HEALTH CARE EDUCATION/TRAINING PROGRAM

## 2023-01-24 PROCEDURE — 99999 PR PBB SHADOW E&M-EST. PATIENT-LVL II: CPT | Mod: PBBFAC,,, | Performed by: STUDENT IN AN ORGANIZED HEALTH CARE EDUCATION/TRAINING PROGRAM

## 2023-01-24 PROCEDURE — 3052F PR MOST RECENT HEMOGLOBIN A1C LEVEL 8.0 - < 9.0%: ICD-10-PCS | Mod: CPTII,S$GLB,, | Performed by: STUDENT IN AN ORGANIZED HEALTH CARE EDUCATION/TRAINING PROGRAM

## 2023-01-24 PROCEDURE — 1160F RVW MEDS BY RX/DR IN RCRD: CPT | Mod: CPTII,S$GLB,, | Performed by: STUDENT IN AN ORGANIZED HEALTH CARE EDUCATION/TRAINING PROGRAM

## 2023-01-24 PROCEDURE — 92012 PR EYE EXAM, EST PATIENT,INTERMED: ICD-10-PCS | Mod: S$GLB,,, | Performed by: STUDENT IN AN ORGANIZED HEALTH CARE EDUCATION/TRAINING PROGRAM

## 2023-01-24 PROCEDURE — 92012 INTRM OPH EXAM EST PATIENT: CPT | Mod: S$GLB,,, | Performed by: STUDENT IN AN ORGANIZED HEALTH CARE EDUCATION/TRAINING PROGRAM

## 2023-01-24 PROCEDURE — 1160F PR REVIEW ALL MEDS BY PRESCRIBER/CLIN PHARMACIST DOCUMENTED: ICD-10-PCS | Mod: CPTII,S$GLB,, | Performed by: STUDENT IN AN ORGANIZED HEALTH CARE EDUCATION/TRAINING PROGRAM

## 2023-02-08 ENCOUNTER — CLINICAL SUPPORT (OUTPATIENT)
Dept: DIABETES | Facility: CLINIC | Age: 43
End: 2023-02-08
Payer: COMMERCIAL

## 2023-02-08 DIAGNOSIS — Z79.4 TYPE 2 DIABETES MELLITUS WITH MILD NONPROLIFERATIVE RETINOPATHY OF RIGHT EYE, WITH LONG-TERM CURRENT USE OF INSULIN, MACULAR EDEMA PRESENCE UNSPECIFIED: ICD-10-CM

## 2023-02-08 DIAGNOSIS — E11.3291 TYPE 2 DIABETES MELLITUS WITH MILD NONPROLIFERATIVE RETINOPATHY OF RIGHT EYE, WITH LONG-TERM CURRENT USE OF INSULIN, MACULAR EDEMA PRESENCE UNSPECIFIED: ICD-10-CM

## 2023-02-08 PROCEDURE — 99999 PR PBB SHADOW E&M-EST. PATIENT-LVL I: ICD-10-PCS | Mod: PBBFAC,,,

## 2023-02-08 PROCEDURE — 99999 PR PBB SHADOW E&M-EST. PATIENT-LVL I: CPT | Mod: PBBFAC,,,

## 2023-02-08 PROCEDURE — G0108 DIAB MANAGE TRN  PER INDIV: HCPCS | Mod: S$GLB,,, | Performed by: STUDENT IN AN ORGANIZED HEALTH CARE EDUCATION/TRAINING PROGRAM

## 2023-02-08 PROCEDURE — G0108 PR DIAB MANAGE TRN  PER INDIV: ICD-10-PCS | Mod: S$GLB,,, | Performed by: STUDENT IN AN ORGANIZED HEALTH CARE EDUCATION/TRAINING PROGRAM

## 2023-02-08 RX ORDER — INSULIN GLARGINE 300 U/ML
65 INJECTION, SOLUTION SUBCUTANEOUS DAILY
Qty: 5 PEN | Refills: 6 | Status: SHIPPED | OUTPATIENT
Start: 2023-02-08 | End: 2023-07-31 | Stop reason: SDUPTHER

## 2023-02-08 NOTE — PROGRESS NOTES
Diabetes Care Specialist Progress Note  Author: Charmaine Grant RN  Date: 2/8/2023    Program Intake  Reason for Diabetes Program Visit:: Initial Diabetes Assessment  Current diabetes risk level:: low  In the last 12 months, have you:: none    Lab Results   Component Value Date    HGBA1C 8.8 (H) 01/20/2023       Clinical    Patient Health Rating  Compared to other people your age, how would you rate your health?: Fair    Problem Review  Active comorbidities affecting diabetes self-care.: yes  Comorbidities: Other (comment) (obesity)    Clinical Assessment  Current Diabetes Treatment: Injectable, Insulin, Oral Medication  Have you ever experienced hypoglycemia (low blood sugar)?: no  Have you ever experienced hyperglycemia (high blood sugar)?: yes  In the last month, how often have you experienced high blood sugar?:  (occassional)  Are you able to tell when your blood sugar is high?: Yes  What are your symptoms?: dizziness, thirst, fatigue  Have you ever been hospitalized because your blood sugar was high?: no    Medication Information  How do you obtain your medications?: Patient drives  How many days a week do you miss your medications?: 3 or more  Do you sometimes have difficulty refilling your medications?: No  Medication adherence impacting ability to self-manage diabetes?: Yes         Nutritional Status  Diet: Regular  Change in appetite?: Yes (since starting Ozempic)  Dentation:: Intact  Recent Changes in Weight: No Recent Weight Change  Current nutritional status an area of need that is impacting patient's ability to self-manage diabetes?: Yes    Additional Social History    Support  Does anyone support you with your diabetes care?: yes  Who supports you?: family member  Who takes you to your medical appointments?: family member  Does the current support meet the patient's needs?: Yes  Is Support an area impacting ability to self-manage diabetes?: No    Access to [employer]  Does the patient have  access to any of the following devices or technologies?: Smart phone  Media or technology needs impacting ability to self-manage diabetes?: No    Cognitive/Behavioral Health  Alert and Oriented: Yes  Difficulty Thinking: No  Requires assistance for routine expression?: No  Cognitive or behavioral barriers impacting ability to self-manage diabetes?: No    Culture/Confucianism  Culture or Shinto beliefs that may impact ability to access healthcare: No    Communication  Language preference: English  Hearing Problems: No  Vision Problems: No  Communication needs impacting ability to self-manage diabetes?: No    Health Literacy  Preferred Learning Method: Face to Face, Group Education, Reading Materials  How often do you need to have someone help you read instructions, pamphlets, or written material from your doctor or pharmacy?: Sometimes  Health literacy needs impacting ability to self-manage diabetes?: No      Diabetes Self-Management Skills Assessment    Diabetes Disease Process/Treatment Options  Patient/caregiver able to state what happens when someone has diabetes.: somewhat  Patient/caregiver knows what type of diabetes they have.: no (type 2)  Patient/caregiver able to identify at least three signs and symptoms of diabetes.: yes  Identified signs and symptoms:: fatigue, frequent urination, increased thirst  Patient able to identify at least three risk factors for diabetes.: yes  Identified risk factors:: family history  Diabetes Disease Process/Treatment Options: Skills Assessment Completed: Yes  Assessment indicates:: Adequate understanding  Area of need?: No    Nutrition/Healthy Eating  Challenges to healthy eating:: portion control, snacking between meals and at night  Method of carbohydrate measurement:: no method  Patient can identify foods that impact blood sugar.: yes  Patient-identified foods:: soda, starches (bread, pasta, rice, cereal)  Nutrition/Healthy Eating Skills Assessment Completed::  Yes  Assessment indicates:: Knowledge deficit, Instruction Needed  Area of need?: Yes    Physical Activity/Exercise  Patient's daily activity level:: sedentary  Patient formally exercises outside of work.: no  Reasons for not exercising:: other (see comments)  Physical Activity/Exercise Skills Assessment Completed: : Yes  Assessment indicates:: Adequate understanding  Area of need?: Yes    Medications  Patient is able to describe current diabetes management routine.: yes  Diabetes management routine:: injectable medications, insulin, oral medications  Patient is able to identify current diabetes medications, dosages, and appropriate timing of medications.: yes (Toujeo 65 units every night, Humalog 20 units before meals, Ozempic weekly, Metformin)  Patient understands the purpose of the medications taken for diabetes.: yes  Patient reports problems or concerns with current medication regimen.: no  Medication Skills Assessment Completed:: Yes  Assessment indicates:: Adequate understanding  Area of need?: Yes    Home Blood Glucose Monitoring  Patient states that blood sugar is checked at home daily.: yes  Monitoring Method:: personal continuous glucose monitor  Personal CGM type:: Freestyle Libre2  Patient is able to use personal CGM appropriately.: yes  CGM Report reviewed?: yes (TIA 79% high, 21% in range, 0% low)  Home Blood Glucose Monitoring Skills Assessment Completed: : Yes  Assessment indicates:: Adequate understanding    Acute Complications  Acute Complications Skills Assessment Completed: : No  Deffered due to:: Time    Chronic Complications  Chronic Complications Skills Assessment Completed: : No  Deferred due to:: Time    Psychosocial/Coping  Psychosocial/Coping Skills Assessment Completed: : No  Deffered due to:: Time      Diabetes Self Support Plan         Assessment Summary and Plan    Based on today's diabetes care assessment, the following areas of need were identified:      Social 2/8/2023   Support  No   Access to [employer] No   Cognitive/Behavioral Health No   Culture/Catholic No   Communication No   Health Literacy No        Clinical 2/8/2023   Medication Adherence Yes Pt's main problem is remembering to take her Humalog insulin before meals   Recently started Ozempic   Nutritional Status Yes Pt states she recently stopped drinking Reg sodas and doesn't have an appetite. Nutrition instructions given  Pt reports she was eating a lot of fruit        Diabetes Self-Management Skills 2/8/2023   Diabetes Disease Process/Treatment Options No   Nutrition/Healthy Eating Yes   Physical Activity/Exercise Yes   Medication Yes          Today's interventions were provided through individual discussion, instruction, and written materials were provided.      Patient verbalized understanding of instruction and written materials.  Pt was able to return back demonstration of instructions today. Patient understood key points, needs reinforcement and further instruction.     Diabetes Self-Management Care Plan:    Today's Diabetes Self-Management Care Plan was developed with Alejandrina's input. Alejandrina has agreed to work toward the following goal(s) to improve his/her overall diabetes control.      Care Plan: Diabetes Management   Updates made since 1/9/2023 12:00 AM        Problem: Medications         Goal: Patient Agrees to take Diabetes Medication(s)as prescribed.    Start Date: 2/8/2023   Priority: High        Task: Reviewed with patient all current diabetes medications and provided basic review of the purpose, dosage, frequency, side effects, and storage of both oral and injectable diabetes medications. Completed 2/8/2023        Task: Discussed guidelines for preventing, detecting and treating hypoglycemia and hyperglycemia and reviewed the importance of meal and medication timing with diabetes mediations for prevention of hypoglycemia and maximum drug benefit. Completed 2/8/2023          Follow Up Plan     No follow-ups on  file.    Today's care plan and follow up schedule was discussed with patient.  Alejandrina verbalized understanding of the care plan, goals, and agrees to follow up plan.        The patient was encouraged to communicate with his/her health care provider/physician and care team regarding his/her condition(s) and treatment.  I provided the patient with my contact information today and encouraged to contact me via phone or [facility] Patient Portal as needed.     Length of Visit   Total Time: No Value exists for the : [facility]#8010 Minutes

## 2023-02-13 ENCOUNTER — OFFICE VISIT (OUTPATIENT)
Dept: OPHTHALMOLOGY | Facility: CLINIC | Age: 43
End: 2023-02-13
Payer: COMMERCIAL

## 2023-02-13 DIAGNOSIS — E11.3393 TYPE 2 DIABETES MELLITUS WITH BOTH EYES AFFECTED BY MODERATE NONPROLIFERATIVE RETINOPATHY WITHOUT MACULAR EDEMA, WITH LONG-TERM CURRENT USE OF INSULIN: Primary | ICD-10-CM

## 2023-02-13 DIAGNOSIS — E11.36 DIABETIC CATARACT OF BOTH EYES: ICD-10-CM

## 2023-02-13 DIAGNOSIS — Z79.4 TYPE 2 DIABETES MELLITUS WITH BOTH EYES AFFECTED BY MODERATE NONPROLIFERATIVE RETINOPATHY WITHOUT MACULAR EDEMA, WITH LONG-TERM CURRENT USE OF INSULIN: Primary | ICD-10-CM

## 2023-02-13 DIAGNOSIS — H40.003 GLAUCOMA SUSPECT OF BOTH EYES: ICD-10-CM

## 2023-02-13 PROCEDURE — 92014 PR EYE EXAM, EST PATIENT,COMPREHESV: ICD-10-PCS | Mod: S$GLB,,, | Performed by: OPHTHALMOLOGY

## 2023-02-13 PROCEDURE — 1160F PR REVIEW ALL MEDS BY PRESCRIBER/CLIN PHARMACIST DOCUMENTED: ICD-10-PCS | Mod: CPTII,S$GLB,, | Performed by: OPHTHALMOLOGY

## 2023-02-13 PROCEDURE — 1160F RVW MEDS BY RX/DR IN RCRD: CPT | Mod: CPTII,S$GLB,, | Performed by: OPHTHALMOLOGY

## 2023-02-13 PROCEDURE — 1159F MED LIST DOCD IN RCRD: CPT | Mod: CPTII,S$GLB,, | Performed by: OPHTHALMOLOGY

## 2023-02-13 PROCEDURE — 1159F PR MEDICATION LIST DOCUMENTED IN MEDICAL RECORD: ICD-10-PCS | Mod: CPTII,S$GLB,, | Performed by: OPHTHALMOLOGY

## 2023-02-13 PROCEDURE — 92014 COMPRE OPH EXAM EST PT 1/>: CPT | Mod: S$GLB,,, | Performed by: OPHTHALMOLOGY

## 2023-02-13 PROCEDURE — 3052F PR MOST RECENT HEMOGLOBIN A1C LEVEL 8.0 - < 9.0%: ICD-10-PCS | Mod: CPTII,S$GLB,, | Performed by: OPHTHALMOLOGY

## 2023-02-13 PROCEDURE — 92134 CPTRZ OPH DX IMG PST SGM RTA: CPT | Mod: S$GLB,,, | Performed by: OPHTHALMOLOGY

## 2023-02-13 PROCEDURE — 99999 PR PBB SHADOW E&M-EST. PATIENT-LVL III: CPT | Mod: PBBFAC,,, | Performed by: OPHTHALMOLOGY

## 2023-02-13 PROCEDURE — 92134 POSTERIOR SEGMENT OCT RETINA (OCULAR COHERENCE TOMOGRAPHY)-BOTH EYES: ICD-10-PCS | Mod: S$GLB,,, | Performed by: OPHTHALMOLOGY

## 2023-02-13 PROCEDURE — 99999 PR PBB SHADOW E&M-EST. PATIENT-LVL III: ICD-10-PCS | Mod: PBBFAC,,, | Performed by: OPHTHALMOLOGY

## 2023-02-13 PROCEDURE — 3052F HG A1C>EQUAL 8.0%<EQUAL 9.0%: CPT | Mod: CPTII,S$GLB,, | Performed by: OPHTHALMOLOGY

## 2023-02-14 NOTE — PROGRESS NOTES
Subjective:       Patient ID: Alejandrina Hernandez is a 42 y.o. female      Chief Complaint   Patient presents with    Diabetic Eye Exam     History of Present Illness  HPI    Dls: 08/11/2022    42 Year old Female is Present for 6 month Follow up.   Pt states no VA Changed since last visit, a little blurrier.  Would like to be Scheduled to See an Optometrist for possible Glasses.   No f/f  Occ itching and burning    Eye meds:   None   Last edited by Bobo Lion MD on 2/14/2023  8:09 AM.        Imaging:    See report    Assessment/Plan:     1. Type 2 diabetes mellitus with both eyes affected by moderate nonproliferative retinopathy without macular edema, with long-term current use of insulin  Has increasing CWS OD>OS  Recommend eval with FA and sl closer f/u of 3-4 mo until assess for ischemia    Diabetic Retinopathy discussed in detail, all questions answered  Stressed importance of good BS/BP/Chol Control  RTC immediately PRN any vision changes, hugh blurry vision, missing vision, floaters, distortions, etc    - OCT- Retina; Future  - Posterior Segment OCT Retina-Both eyes    2. Glaucoma suspect of both eyes  Keep f/u with Dr Ley as planned    3. Diabetic cataract of both eyes  Refraction    Follow up in about 3 months (around 5/13/2023), or if symptoms worsen or fail to improve, for Comprehensive Examination, Fundus Photo, IVFA Right Transit.

## 2023-02-16 ENCOUNTER — TELEPHONE (OUTPATIENT)
Dept: ENDOCRINOLOGY | Facility: CLINIC | Age: 43
End: 2023-02-16
Payer: COMMERCIAL

## 2023-02-16 DIAGNOSIS — E11.3291 TYPE 2 DIABETES MELLITUS WITH MILD NONPROLIFERATIVE RETINOPATHY OF RIGHT EYE, WITH LONG-TERM CURRENT USE OF INSULIN, MACULAR EDEMA PRESENCE UNSPECIFIED: ICD-10-CM

## 2023-02-16 DIAGNOSIS — Z79.4 TYPE 2 DIABETES MELLITUS WITH MILD NONPROLIFERATIVE RETINOPATHY OF RIGHT EYE, WITH LONG-TERM CURRENT USE OF INSULIN, MACULAR EDEMA PRESENCE UNSPECIFIED: ICD-10-CM

## 2023-02-16 RX ORDER — INSULIN LISPRO 100 [IU]/ML
20 INJECTION, SOLUTION INTRAVENOUS; SUBCUTANEOUS
Qty: 18 ML | Refills: 11 | Status: SHIPPED | OUTPATIENT
Start: 2023-02-16 | End: 2024-03-04 | Stop reason: SDUPTHER

## 2023-02-16 NOTE — TELEPHONE ENCOUNTER
----- Message from Dilma Biswas sent at 2023 11:21 AM CST -----  Contact: PATIENT  Alejandrina Hernandez  MRN: [id number]  : 1980  PCP: Jenny Edmonds  Home Phone      [phone]  Work Phone      Not on file.  Mobile          [phone]      MESSAGE: Patient needs a refill on insulin lispro (HUMALOG KWIKPEN INSULIN) 100 unit sent to [facility].        Phone: [phone]

## 2023-02-27 LAB
CHLAMYDIA BY NAA: NEGATIVE
GONOCOCCUS BY NAA: NEGATIVE
PAP RECOMMENDATION EXT: NORMAL
TRICH VAG BY NAA: NEGATIVE

## 2023-03-31 ENCOUNTER — TELEPHONE (OUTPATIENT)
Dept: ENDOCRINOLOGY | Facility: CLINIC | Age: 43
End: 2023-03-31
Payer: COMMERCIAL

## 2023-03-31 DIAGNOSIS — Z79.4 TYPE 2 DIABETES MELLITUS WITH MILD NONPROLIFERATIVE RETINOPATHY OF RIGHT EYE, WITH LONG-TERM CURRENT USE OF INSULIN, MACULAR EDEMA PRESENCE UNSPECIFIED: Primary | ICD-10-CM

## 2023-03-31 DIAGNOSIS — E11.3291 TYPE 2 DIABETES MELLITUS WITH MILD NONPROLIFERATIVE RETINOPATHY OF RIGHT EYE, WITH LONG-TERM CURRENT USE OF INSULIN, MACULAR EDEMA PRESENCE UNSPECIFIED: Primary | ICD-10-CM

## 2023-03-31 RX ORDER — PEN NEEDLE, DIABETIC 31 GX5/16"
NEEDLE, DISPOSABLE MISCELLANEOUS
Qty: 150 EACH | Refills: 11 | Status: SHIPPED | OUTPATIENT
Start: 2023-03-31

## 2023-05-11 ENCOUNTER — TELEPHONE (OUTPATIENT)
Dept: ENDOCRINOLOGY | Facility: CLINIC | Age: 43
End: 2023-05-11
Payer: COMMERCIAL

## 2023-05-11 DIAGNOSIS — Z79.4 TYPE 2 DIABETES MELLITUS WITH MILD NONPROLIFERATIVE RETINOPATHY OF RIGHT EYE, WITH LONG-TERM CURRENT USE OF INSULIN, MACULAR EDEMA PRESENCE UNSPECIFIED: Primary | ICD-10-CM

## 2023-05-11 DIAGNOSIS — E11.3291 TYPE 2 DIABETES MELLITUS WITH MILD NONPROLIFERATIVE RETINOPATHY OF RIGHT EYE, WITH LONG-TERM CURRENT USE OF INSULIN, MACULAR EDEMA PRESENCE UNSPECIFIED: Primary | ICD-10-CM

## 2023-05-11 RX ORDER — TIRZEPATIDE 5 MG/.5ML
5 INJECTION, SOLUTION SUBCUTANEOUS
Qty: 4 PEN | Refills: 11 | Status: SHIPPED | OUTPATIENT
Start: 2023-05-11 | End: 2023-06-30

## 2023-05-11 NOTE — TELEPHONE ENCOUNTER
Patient notified of results, verbalized understanding.          ----- Message from Mendez Roberts MD sent at 5/11/2023 12:20 PM CDT -----  I recommend switching Ozempic to Mounjaro. Rx sent to [facility].     ----- Message -----  From: Charlotte Cohen MA  Sent: 5/11/2023  11:20 AM CDT  To: Mendez Roberts MD    Patient notified of results, verbalized understanding.  Pt said she stop taking her metformin bc it was giving her the runs asking if you hagve anything else you can send in    ----- Message -----  From: Mendez Roberts MD  Sent: 5/10/2023   6:28 AM CDT  To: Charlotte Cohen MA    Please inform that I reviewed results.    A1c went up to 9.6. Is she having any issues with medications? The Maritza?

## 2023-05-19 ENCOUNTER — OFFICE VISIT (OUTPATIENT)
Dept: OPHTHALMOLOGY | Facility: CLINIC | Age: 43
End: 2023-05-19
Payer: COMMERCIAL

## 2023-05-19 DIAGNOSIS — E11.3393 TYPE 2 DIABETES MELLITUS WITH BOTH EYES AFFECTED BY MODERATE NONPROLIFERATIVE RETINOPATHY WITHOUT MACULAR EDEMA, WITH LONG-TERM CURRENT USE OF INSULIN: Primary | ICD-10-CM

## 2023-05-19 DIAGNOSIS — Z79.4 TYPE 2 DIABETES MELLITUS WITH BOTH EYES AFFECTED BY MODERATE NONPROLIFERATIVE RETINOPATHY WITHOUT MACULAR EDEMA, WITH LONG-TERM CURRENT USE OF INSULIN: Primary | ICD-10-CM

## 2023-05-19 PROCEDURE — 92012 INTRM OPH EXAM EST PATIENT: CPT | Mod: ,,, | Performed by: OPHTHALMOLOGY

## 2023-05-19 PROCEDURE — 1159F PR MEDICATION LIST DOCUMENTED IN MEDICAL RECORD: ICD-10-PCS | Mod: CPTII,,, | Performed by: OPHTHALMOLOGY

## 2023-05-19 PROCEDURE — 92250 FUNDUS PHOTOGRAPHY W/I&R: CPT | Mod: ,,, | Performed by: OPHTHALMOLOGY

## 2023-05-19 PROCEDURE — 92250 COLOR FUNDUS PHOTOGRAPHY - OU - BOTH EYES: ICD-10-PCS | Mod: ,,, | Performed by: OPHTHALMOLOGY

## 2023-05-19 PROCEDURE — 99999 PR PBB SHADOW E&M-EST. PATIENT-LVL III: CPT | Mod: PBBFAC,,, | Performed by: OPHTHALMOLOGY

## 2023-05-19 PROCEDURE — 99213 OFFICE O/P EST LOW 20 MIN: CPT | Performed by: OPHTHALMOLOGY

## 2023-05-19 PROCEDURE — 92235 FLUORESCEIN ANGRPH MLTIFRAME: CPT | Mod: ,,, | Performed by: OPHTHALMOLOGY

## 2023-05-19 PROCEDURE — 92012 PR EYE EXAM, EST PATIENT,INTERMED: ICD-10-PCS | Mod: ,,, | Performed by: OPHTHALMOLOGY

## 2023-05-19 PROCEDURE — 92235 FLUORESCEIN ANGIOGRAPHY - OU - BOTH EYES: ICD-10-PCS | Mod: ,,, | Performed by: OPHTHALMOLOGY

## 2023-05-19 PROCEDURE — 1159F MED LIST DOCD IN RCRD: CPT | Mod: CPTII,,, | Performed by: OPHTHALMOLOGY

## 2023-05-19 PROCEDURE — 3046F PR MOST RECENT HEMOGLOBIN A1C LEVEL > 9.0%: ICD-10-PCS | Mod: CPTII,,, | Performed by: OPHTHALMOLOGY

## 2023-05-19 PROCEDURE — 3046F HEMOGLOBIN A1C LEVEL >9.0%: CPT | Mod: CPTII,,, | Performed by: OPHTHALMOLOGY

## 2023-05-19 PROCEDURE — 99999 PR PBB SHADOW E&M-EST. PATIENT-LVL III: ICD-10-PCS | Mod: PBBFAC,,, | Performed by: OPHTHALMOLOGY

## 2023-05-19 RX ADMIN — FLUORESCEIN 500 MG: 500 INJECTION INTRAVENOUS at 10:05

## 2023-05-22 RX ORDER — FLUORESCEIN 500 MG/ML
5 INJECTION INTRAVENOUS ONCE
Status: COMPLETED | OUTPATIENT
Start: 2023-05-22 | End: 2023-05-19

## 2023-05-22 NOTE — PROGRESS NOTES
Subjective:       Patient ID: Alejandrina Hernandez is a 42 y.o. female      Chief Complaint   Patient presents with    Diabetic Eye Exam    Concerns About Ocular Health    Blurred Vision     History of Present Illness  HPI    Dls: 02/13/2023 Dr. Lion     Pt states she is doing well, She would like to see about getting some   glasses if it will help.     No Fl/Oscar/Pain    Eye Meds:   No Gtts   Last edited by Bobo Lion MD on 5/22/2023  2:33 PM.        Imaging:    See report    Assessment/Plan:     1. Type 2 diabetes mellitus with both eyes affected by moderate nonproliferative retinopathy without macular edema, with long-term current use of insulin  CWS resolved today  Recommend eval with FA today reveals no sig ischemia and no NV    Diabetic Retinopathy discussed in detail, all questions answered  Stressed importance of good BS/BP/Chol Control  RTC immediately PRN any vision changes, hugh blurry vision, missing vision, floaters, distortions, etc    - OCT- Retina; Future  - Posterior Segment OCT Retina-Both eyes    2. Glaucoma suspect of both eyes  Keep f/u with Dr Ley as planned    3. Diabetic cataract of both eyes  Refraction    Follow up in about 6 months (around 11/19/2023) for Comprehensive Examination, OCT Mac.

## 2023-06-12 ENCOUNTER — PATIENT OUTREACH (OUTPATIENT)
Dept: ADMINISTRATIVE | Facility: HOSPITAL | Age: 43
End: 2023-06-12
Payer: COMMERCIAL

## 2023-06-30 ENCOUNTER — OFFICE VISIT (OUTPATIENT)
Dept: ENDOCRINOLOGY | Facility: CLINIC | Age: 43
End: 2023-06-30
Payer: COMMERCIAL

## 2023-06-30 VITALS
HEART RATE: 79 BPM | SYSTOLIC BLOOD PRESSURE: 128 MMHG | BODY MASS INDEX: 37.61 KG/M2 | RESPIRATION RATE: 18 BRPM | HEIGHT: 66 IN | DIASTOLIC BLOOD PRESSURE: 70 MMHG | WEIGHT: 234 LBS

## 2023-06-30 DIAGNOSIS — E78.2 MIXED HYPERLIPIDEMIA: ICD-10-CM

## 2023-06-30 DIAGNOSIS — E11.3291 TYPE 2 DIABETES MELLITUS WITH MILD NONPROLIFERATIVE RETINOPATHY OF RIGHT EYE, WITH LONG-TERM CURRENT USE OF INSULIN, MACULAR EDEMA PRESENCE UNSPECIFIED: Primary | ICD-10-CM

## 2023-06-30 DIAGNOSIS — Z79.4 TYPE 2 DIABETES MELLITUS WITH MILD NONPROLIFERATIVE RETINOPATHY OF RIGHT EYE, WITH LONG-TERM CURRENT USE OF INSULIN, MACULAR EDEMA PRESENCE UNSPECIFIED: Primary | ICD-10-CM

## 2023-06-30 DIAGNOSIS — E03.4 HYPOTHYROIDISM DUE TO ACQUIRED ATROPHY OF THYROID: ICD-10-CM

## 2023-06-30 PROCEDURE — 3046F PR MOST RECENT HEMOGLOBIN A1C LEVEL > 9.0%: ICD-10-PCS | Mod: CPTII,S$GLB,, | Performed by: STUDENT IN AN ORGANIZED HEALTH CARE EDUCATION/TRAINING PROGRAM

## 2023-06-30 PROCEDURE — 1159F MED LIST DOCD IN RCRD: CPT | Mod: CPTII,S$GLB,, | Performed by: STUDENT IN AN ORGANIZED HEALTH CARE EDUCATION/TRAINING PROGRAM

## 2023-06-30 PROCEDURE — 99214 PR OFFICE/OUTPT VISIT, EST, LEVL IV, 30-39 MIN: ICD-10-PCS | Mod: 25,S$GLB,, | Performed by: STUDENT IN AN ORGANIZED HEALTH CARE EDUCATION/TRAINING PROGRAM

## 2023-06-30 PROCEDURE — 3078F DIAST BP <80 MM HG: CPT | Mod: CPTII,S$GLB,, | Performed by: STUDENT IN AN ORGANIZED HEALTH CARE EDUCATION/TRAINING PROGRAM

## 2023-06-30 PROCEDURE — 99999 PR PBB SHADOW E&M-EST. PATIENT-LVL V: CPT | Mod: PBBFAC,,, | Performed by: STUDENT IN AN ORGANIZED HEALTH CARE EDUCATION/TRAINING PROGRAM

## 2023-06-30 PROCEDURE — 95251 PR GLUCOSE MONITOR, 72 HOUR, PHYS INTERP: ICD-10-PCS | Mod: S$GLB,,, | Performed by: STUDENT IN AN ORGANIZED HEALTH CARE EDUCATION/TRAINING PROGRAM

## 2023-06-30 PROCEDURE — 3078F PR MOST RECENT DIASTOLIC BLOOD PRESSURE < 80 MM HG: ICD-10-PCS | Mod: CPTII,S$GLB,, | Performed by: STUDENT IN AN ORGANIZED HEALTH CARE EDUCATION/TRAINING PROGRAM

## 2023-06-30 PROCEDURE — 99999 PR PBB SHADOW E&M-EST. PATIENT-LVL V: ICD-10-PCS | Mod: PBBFAC,,, | Performed by: STUDENT IN AN ORGANIZED HEALTH CARE EDUCATION/TRAINING PROGRAM

## 2023-06-30 PROCEDURE — 3008F BODY MASS INDEX DOCD: CPT | Mod: CPTII,S$GLB,, | Performed by: STUDENT IN AN ORGANIZED HEALTH CARE EDUCATION/TRAINING PROGRAM

## 2023-06-30 PROCEDURE — 95251 CONT GLUC MNTR ANALYSIS I&R: CPT | Mod: S$GLB,,, | Performed by: STUDENT IN AN ORGANIZED HEALTH CARE EDUCATION/TRAINING PROGRAM

## 2023-06-30 PROCEDURE — 1159F PR MEDICATION LIST DOCUMENTED IN MEDICAL RECORD: ICD-10-PCS | Mod: CPTII,S$GLB,, | Performed by: STUDENT IN AN ORGANIZED HEALTH CARE EDUCATION/TRAINING PROGRAM

## 2023-06-30 PROCEDURE — 99214 OFFICE O/P EST MOD 30 MIN: CPT | Mod: 25,S$GLB,, | Performed by: STUDENT IN AN ORGANIZED HEALTH CARE EDUCATION/TRAINING PROGRAM

## 2023-06-30 PROCEDURE — 3074F PR MOST RECENT SYSTOLIC BLOOD PRESSURE < 130 MM HG: ICD-10-PCS | Mod: CPTII,S$GLB,, | Performed by: STUDENT IN AN ORGANIZED HEALTH CARE EDUCATION/TRAINING PROGRAM

## 2023-06-30 PROCEDURE — 3074F SYST BP LT 130 MM HG: CPT | Mod: CPTII,S$GLB,, | Performed by: STUDENT IN AN ORGANIZED HEALTH CARE EDUCATION/TRAINING PROGRAM

## 2023-06-30 PROCEDURE — 3046F HEMOGLOBIN A1C LEVEL >9.0%: CPT | Mod: CPTII,S$GLB,, | Performed by: STUDENT IN AN ORGANIZED HEALTH CARE EDUCATION/TRAINING PROGRAM

## 2023-06-30 PROCEDURE — 3008F PR BODY MASS INDEX (BMI) DOCUMENTED: ICD-10-PCS | Mod: CPTII,S$GLB,, | Performed by: STUDENT IN AN ORGANIZED HEALTH CARE EDUCATION/TRAINING PROGRAM

## 2023-06-30 RX ORDER — FLUCONAZOLE 150 MG/1
TABLET ORAL
Qty: 2 TABLET | Refills: 0 | Status: SHIPPED | OUTPATIENT
Start: 2023-06-30 | End: 2023-07-31 | Stop reason: SDUPTHER

## 2023-06-30 NOTE — ASSESSMENT & PLAN NOTE
Control has improved significantly with use of GLP-1/GIP RA, will increase dose for greater benefit. She wishes to try SGLT-2 inhibitor now as glucose is better controlled. Continue MDI insulin for now - may need less.    Plan  - Continue Toujeo 60 U HS  - Continue Humalog 5-20 U AC depending on meal size  - Increase Mounjaro to 7.5 mg weekly  - Start Jardiance 25 mg daily  - Continue FreeStyle Maritza 2 CGM      Labs Aug 2023    F/u 4 months   No

## 2023-06-30 NOTE — PROGRESS NOTES
Subjective:      Patient ID: Alejandrina Hernandez is a 42 y.o. female.    Chief Complaint:  Type 2 diabetes    History of Present Illness  This is a 42 y.o. female. with a past medical history of DM2, HLD, HTN, obesity here for DM2.    Type 2 diabetes mellitus  Diagnosed in late 20s    Current diabetic medications:  - Toujeo 60 U HS   - Humalog 5-20 U AC depending on meal size  - Mounjaro 5 mg weekly    Past diabetes medications:  - Metformin - diarrhea  - Trulicity    Lab Results   Component Value Date    CREATININE 0.8 05/09/2023    CREATININE 0.8 05/09/2023    EGFRNORACEVR >60.0 05/09/2023    EGFRNORACEVR >60.0 05/09/2023         Known diabetic complications: retinopathy    Weight trend: stable  Prior visit with diabetes education: yes    Current diet: 2 meals usually, sometimes 3  Current exercise: Limited with back pain    Wt Readings from Last 6 Encounters:   06/30/23 106.1 kg (234 lb)   06/19/23 104.3 kg (230 lb)   05/16/23 113 kg (249 lb 1.6 oz)   01/20/23 114 kg (251 lb 5.2 oz)   11/16/22 111.1 kg (245 lb)   10/21/22 114.5 kg (252 lb 6 oz)           Mother DM2  Maternal grandfather DM2  Paternal grandmother DM2  A lot of cousins and aunts with DM2    Diabetes Management Status  Statin: Taking  ACE/ARB: Not taking    Screening or Prevention Patient's value   HgA1C Testing and Control   Lab Results   Component Value Date    HGBA1C 9.6 (H) 05/09/2023        LDL control Lab Results   Component Value Date    LDLCALC 102.8 08/24/2022      Nephropathy screening Lab Results   Component Value Date    MICALBCREAT 95.9 (H) 08/24/2022              Hypothyroidism  Currently on levothyroxine 125 mcg daily  Reports takes 30 min before eating or drinking anything other than water.     Lab Results   Component Value Date    TSH 0.640 05/09/2023         Review of Systems  As above    Social and family history reviewed  Current medications and allergies reviewed    Objective:   /70 (BP Location: Left arm, Patient Position:  "Sitting, BP Method: Medium (Manual))   Pulse 79   Resp 18   Ht 5' 6" (1.676 m)   Wt 106.1 kg (234 lb)   BMI 37.77 kg/m²   Physical Exam   Some areas of lipodystrophy in abdominal region      BP Readings from Last 1 Encounters:   06/30/23 128/70      Wt Readings from Last 1 Encounters:   06/30/23 1032 106.1 kg (234 lb)     Body mass index is 37.77 kg/m².    Lab Review:   Lab Results   Component Value Date    HGBA1C 9.6 (H) 05/09/2023     Lab Results   Component Value Date    CHOL 171 08/24/2022    HDL 43 08/24/2022    LDLCALC 102.8 08/24/2022    TRIG 126 08/24/2022    CHOLHDL 25.1 08/24/2022     Lab Results   Component Value Date     05/09/2023     05/09/2023    K 4.4 05/09/2023    K 4.4 05/09/2023     05/09/2023     05/09/2023    CO2 21 (L) 05/09/2023    CO2 21 (L) 05/09/2023     (H) 05/09/2023     (H) 05/09/2023    BUN 12 05/09/2023    BUN 12 05/09/2023    CREATININE 0.8 05/09/2023    CREATININE 0.8 05/09/2023    CALCIUM 9.4 05/09/2023    CALCIUM 9.4 05/09/2023    PROT 7.6 05/09/2023    ALBUMIN 4.2 05/09/2023    BILITOT 0.8 05/09/2023    ALKPHOS 47 (L) 05/09/2023    AST 25 05/09/2023    ALT 42 05/09/2023    ANIONGAP 11 05/09/2023    ANIONGAP 11 05/09/2023    ESTGFRAFRICA >60.0 01/28/2022    EGFRNONAA >60.0 01/28/2022    TSH 0.640 05/09/2023       All pertinent labs reviewed    Assessment and Plan     Type 2 diabetes mellitus with mild nonproliferative retinopathy of right eye, with long-term current use of insulin  Control has improved significantly with use of GLP-1/GIP RA, will increase dose for greater benefit. She wishes to try SGLT-2 inhibitor now as glucose is better controlled. Continue MDI insulin for now - may need less.    Plan  - Continue Toujeo 60 U HS  - Continue Humalog 5-20 U AC depending on meal size  - Increase Mounjaro to 7.5 mg weekly  - Start Jardiance 25 mg daily  - Continue FreeStyle Maritza 2 CGM      Labs Aug 2023    F/u 4 months    BMI 37.0-37.9, " adult  Continue GLP-1/GIP RA given weight loss benefit    Mixed hyperlipidemia  Continue statin    Hypothyroidism due to acquired atrophy of thyroid  Last TSH at goal for age  Continue levothyroxine 125 mcg daily    Recheck TSH with next labs        F/u 4 months    Mendez Roberts MD  Endocrinology

## 2023-06-30 NOTE — PATIENT INSTRUCTIONS
Metformin has been taken out of your list - please inform your pharmacy    Start Jardiance 25 mg daily    Increase Mounjaro to 7.5 mg weekly    Continue Toujeo 60 U at night and Humalog depending on meal size    Continue levothyoxine 125 mcg daily    Labs in mid August

## 2023-07-31 DIAGNOSIS — E11.3291 TYPE 2 DIABETES MELLITUS WITH MILD NONPROLIFERATIVE RETINOPATHY OF RIGHT EYE, WITH LONG-TERM CURRENT USE OF INSULIN, MACULAR EDEMA PRESENCE UNSPECIFIED: ICD-10-CM

## 2023-07-31 DIAGNOSIS — Z79.4 TYPE 2 DIABETES MELLITUS WITH MILD NONPROLIFERATIVE RETINOPATHY OF RIGHT EYE, WITH LONG-TERM CURRENT USE OF INSULIN, MACULAR EDEMA PRESENCE UNSPECIFIED: ICD-10-CM

## 2023-07-31 RX ORDER — INSULIN GLARGINE 300 U/ML
60 INJECTION, SOLUTION SUBCUTANEOUS DAILY
Qty: 4 PEN | Refills: 11 | Status: SHIPPED | OUTPATIENT
Start: 2023-07-31 | End: 2024-07-30

## 2023-07-31 RX ORDER — FLUCONAZOLE 150 MG/1
TABLET ORAL
Qty: 2 TABLET | Refills: 0 | Status: SHIPPED | OUTPATIENT
Start: 2023-07-31 | End: 2023-08-28 | Stop reason: SDUPTHER

## 2023-08-10 ENCOUNTER — TELEPHONE (OUTPATIENT)
Dept: ENDOCRINOLOGY | Facility: CLINIC | Age: 43
End: 2023-08-10
Payer: COMMERCIAL

## 2023-08-10 DIAGNOSIS — Z79.4 TYPE 2 DIABETES MELLITUS WITH MILD NONPROLIFERATIVE RETINOPATHY OF RIGHT EYE, WITH LONG-TERM CURRENT USE OF INSULIN, MACULAR EDEMA PRESENCE UNSPECIFIED: Primary | ICD-10-CM

## 2023-08-10 DIAGNOSIS — E11.3291 TYPE 2 DIABETES MELLITUS WITH MILD NONPROLIFERATIVE RETINOPATHY OF RIGHT EYE, WITH LONG-TERM CURRENT USE OF INSULIN, MACULAR EDEMA PRESENCE UNSPECIFIED: Primary | ICD-10-CM

## 2023-08-10 NOTE — TELEPHONE ENCOUNTER
----- Message from Dilma Biswas sent at 8/10/2023  1:54 PM CDT -----  Contact: PATIENT  Alejandrina Hernandez  MRN: [id number]  : 1980  PCP: Jenny Edmonds  Home Phone      [phone]  Work Phone      Not on file.  Mobile          [phone]      MESSAGE: Patient states that Dr. Roberts increased the Mounjaro to 10 mg but when she picked the prescription up at the pharmacy it was for the 7.5 mg.  She would like a new prescription sent to [facility] for the increase.        Phone: [phone]

## 2023-08-10 NOTE — TELEPHONE ENCOUNTER
Patient calling states that she should be on Mounjaro 10mg. At visit on 6/30 you increased from 5mg to 7.5mg, no mention of 10mg.     Contacted [facility] pharmacy.   Filled several 5mg rx, then switch to 7.5 mg filled on 6/30 and then again on 7/31.

## 2023-08-21 DIAGNOSIS — E11.3291 TYPE 2 DIABETES MELLITUS WITH MILD NONPROLIFERATIVE RETINOPATHY OF RIGHT EYE, WITH LONG-TERM CURRENT USE OF INSULIN, MACULAR EDEMA PRESENCE UNSPECIFIED: Primary | ICD-10-CM

## 2023-08-21 DIAGNOSIS — Z79.4 TYPE 2 DIABETES MELLITUS WITH MILD NONPROLIFERATIVE RETINOPATHY OF RIGHT EYE, WITH LONG-TERM CURRENT USE OF INSULIN, MACULAR EDEMA PRESENCE UNSPECIFIED: Primary | ICD-10-CM

## 2023-08-21 RX ORDER — FLASH GLUCOSE SENSOR
KIT MISCELLANEOUS
Qty: 2 KIT | Refills: 11 | Status: SHIPPED | OUTPATIENT
Start: 2023-08-21

## 2023-08-21 NOTE — TELEPHONE ENCOUNTER
Refill freestyle chiquis 2 sensor to [facility].     Last office visit 6/2023.

## 2023-08-28 DIAGNOSIS — E11.3291 TYPE 2 DIABETES MELLITUS WITH MILD NONPROLIFERATIVE RETINOPATHY OF RIGHT EYE, WITH LONG-TERM CURRENT USE OF INSULIN, MACULAR EDEMA PRESENCE UNSPECIFIED: ICD-10-CM

## 2023-08-28 DIAGNOSIS — Z79.4 TYPE 2 DIABETES MELLITUS WITH MILD NONPROLIFERATIVE RETINOPATHY OF RIGHT EYE, WITH LONG-TERM CURRENT USE OF INSULIN, MACULAR EDEMA PRESENCE UNSPECIFIED: ICD-10-CM

## 2023-08-28 RX ORDER — FLUCONAZOLE 150 MG/1
TABLET ORAL
Qty: 2 TABLET | Refills: 0 | Status: SHIPPED | OUTPATIENT
Start: 2023-08-28 | End: 2023-11-07 | Stop reason: SDUPTHER

## 2023-09-11 ENCOUNTER — TELEPHONE (OUTPATIENT)
Dept: ENDOCRINOLOGY | Facility: CLINIC | Age: 43
End: 2023-09-11
Payer: COMMERCIAL

## 2023-09-11 NOTE — TELEPHONE ENCOUNTER
Patient notified of results, verbalized understanding.      ----- Message from Mendez Roberts MD sent at 9/11/2023  7:56 AM CDT -----  Please inform that I reviewed results.    A1c is 7.1 which is excellent. Continue same diabetes medications.    Thyroid levels are at goal too. Continue same dose of thyroid med.

## 2023-11-07 DIAGNOSIS — Z79.4 TYPE 2 DIABETES MELLITUS WITH MILD NONPROLIFERATIVE RETINOPATHY OF RIGHT EYE, WITH LONG-TERM CURRENT USE OF INSULIN, MACULAR EDEMA PRESENCE UNSPECIFIED: ICD-10-CM

## 2023-11-07 DIAGNOSIS — E11.3291 TYPE 2 DIABETES MELLITUS WITH MILD NONPROLIFERATIVE RETINOPATHY OF RIGHT EYE, WITH LONG-TERM CURRENT USE OF INSULIN, MACULAR EDEMA PRESENCE UNSPECIFIED: ICD-10-CM

## 2023-11-07 RX ORDER — FLUCONAZOLE 150 MG/1
TABLET ORAL
Qty: 2 TABLET | Refills: 0 | Status: SHIPPED | OUTPATIENT
Start: 2023-11-07 | End: 2023-12-04 | Stop reason: SDUPTHER

## 2023-11-07 NOTE — TELEPHONE ENCOUNTER
----- Message from Dilma Biswas sent at 2023 11:14 AM CST -----  Contact: PATIENT  Alejandrina Hernandez  MRN: [id number]  : 1980  PCP: Jenny Edmonds  Home Phone      [phone]  Work Phone      Not on file.  Mobile          [phone]      MESSAGE: Patient states that she has a yeast infection and would like to know if Dr. Roberts can prescribe her something for it.  She would like it sent to [facility].          Phone: [phone]

## 2023-12-04 ENCOUNTER — TELEPHONE (OUTPATIENT)
Dept: ENDOCRINOLOGY | Facility: CLINIC | Age: 43
End: 2023-12-04
Payer: COMMERCIAL

## 2023-12-04 DIAGNOSIS — Z79.4 TYPE 2 DIABETES MELLITUS WITH MILD NONPROLIFERATIVE RETINOPATHY OF RIGHT EYE, WITH LONG-TERM CURRENT USE OF INSULIN, MACULAR EDEMA PRESENCE UNSPECIFIED: Primary | ICD-10-CM

## 2023-12-04 DIAGNOSIS — E11.3291 TYPE 2 DIABETES MELLITUS WITH MILD NONPROLIFERATIVE RETINOPATHY OF RIGHT EYE, WITH LONG-TERM CURRENT USE OF INSULIN, MACULAR EDEMA PRESENCE UNSPECIFIED: Primary | ICD-10-CM

## 2023-12-04 RX ORDER — FLUCONAZOLE 150 MG/1
TABLET ORAL
Qty: 2 TABLET | Refills: 0 | Status: SHIPPED | OUTPATIENT
Start: 2023-12-04 | End: 2024-01-23 | Stop reason: SDUPTHER

## 2023-12-04 RX ORDER — BLOOD-GLUCOSE SENSOR
1 EACH MISCELLANEOUS
Qty: 2 EACH | Refills: 11 | Status: SHIPPED | OUTPATIENT
Start: 2023-12-04

## 2023-12-27 NOTE — PROGRESS NOTES
Subjective:       Patient ID: Alejandrina Hernandez is a 43 y.o. female.    Chief Complaint: Follow-up    HPI    Alejandrina Hernandez is a/an 43 y.o. female here for 6 month follow up   Pt states she believes eyes are getting a bit worse   Vision during VA exam is blurry but corrected with pin hole   Believes she may need glasses     Eye Meds:   NONE      Last edited by Nancy Moore on 12/29/2023 10:13 AM.              Assessment & Plan   Glaucoma suspect of both eyes  -     Posterior Segment OCT Optic Nerve- Both eyes  -     Meyer Visual Field - OU - Extended - Both Eyes    Type 2 diabetes mellitus with both eyes affected by moderate nonproliferative retinopathy without macular edema, with long-term current use of insulin    Diabetic cataract of both eyes    Nuclear sclerosis of both eyes    Myopia of both eyes with astigmatism and presbyopia    Open angle with borderline findings and high glaucoma risk in both eyes  -     Posterior Segment OCT Optic Nerve- Both eyes  -     Meyer Visual Field - OU - Extended - Both Eyes      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 Ayad STEVENSON scheduled 2/2023    RE  Mrx provided today      RTC 6 months HVF 24-2, OCT-RNFL, DFE, disc photos        Kamila Ley M.D., M.S.  Department of Ophthalmology   Division of Glaucoma Surgery  [facility]

## 2023-12-29 ENCOUNTER — CLINICAL SUPPORT (OUTPATIENT)
Dept: OPHTHALMOLOGY | Facility: CLINIC | Age: 43
End: 2023-12-29
Payer: COMMERCIAL

## 2023-12-29 ENCOUNTER — OFFICE VISIT (OUTPATIENT)
Dept: OPHTHALMOLOGY | Facility: CLINIC | Age: 43
End: 2023-12-29
Payer: COMMERCIAL

## 2023-12-29 DIAGNOSIS — H25.13 NUCLEAR SCLEROSIS OF BOTH EYES: ICD-10-CM

## 2023-12-29 DIAGNOSIS — H52.4 MYOPIA OF BOTH EYES WITH ASTIGMATISM AND PRESBYOPIA: ICD-10-CM

## 2023-12-29 DIAGNOSIS — E11.36 DIABETIC CATARACT OF BOTH EYES: ICD-10-CM

## 2023-12-29 DIAGNOSIS — H40.003 GLAUCOMA SUSPECT OF BOTH EYES: Primary | ICD-10-CM

## 2023-12-29 DIAGNOSIS — E11.3393 TYPE 2 DIABETES MELLITUS WITH BOTH EYES AFFECTED BY MODERATE NONPROLIFERATIVE RETINOPATHY WITHOUT MACULAR EDEMA, WITH LONG-TERM CURRENT USE OF INSULIN: ICD-10-CM

## 2023-12-29 DIAGNOSIS — H52.13 MYOPIA OF BOTH EYES WITH ASTIGMATISM AND PRESBYOPIA: ICD-10-CM

## 2023-12-29 DIAGNOSIS — H52.203 MYOPIA OF BOTH EYES WITH ASTIGMATISM AND PRESBYOPIA: ICD-10-CM

## 2023-12-29 DIAGNOSIS — Z79.4 TYPE 2 DIABETES MELLITUS WITH BOTH EYES AFFECTED BY MODERATE NONPROLIFERATIVE RETINOPATHY WITHOUT MACULAR EDEMA, WITH LONG-TERM CURRENT USE OF INSULIN: ICD-10-CM

## 2023-12-29 DIAGNOSIS — H40.023 OPEN ANGLE WITH BORDERLINE FINDINGS AND HIGH GLAUCOMA RISK IN BOTH EYES: ICD-10-CM

## 2023-12-29 PROCEDURE — 99999 PR PBB SHADOW E&M-EST. PATIENT-LVL III: ICD-10-PCS | Mod: PBBFAC,,, | Performed by: STUDENT IN AN ORGANIZED HEALTH CARE EDUCATION/TRAINING PROGRAM

## 2023-12-29 PROCEDURE — 3066F PR DOCUMENTATION OF TREATMENT FOR NEPHROPATHY: ICD-10-PCS | Mod: CPTII,S$GLB,, | Performed by: STUDENT IN AN ORGANIZED HEALTH CARE EDUCATION/TRAINING PROGRAM

## 2023-12-29 PROCEDURE — 3061F PR NEG MICROALBUMINURIA RESULT DOCUMENTED/REVIEW: ICD-10-PCS | Mod: CPTII,S$GLB,, | Performed by: STUDENT IN AN ORGANIZED HEALTH CARE EDUCATION/TRAINING PROGRAM

## 2023-12-29 PROCEDURE — 99214 PR OFFICE/OUTPT VISIT, EST, LEVL IV, 30-39 MIN: ICD-10-PCS | Mod: S$GLB,,, | Performed by: STUDENT IN AN ORGANIZED HEALTH CARE EDUCATION/TRAINING PROGRAM

## 2023-12-29 PROCEDURE — 3061F NEG MICROALBUMINURIA REV: CPT | Mod: CPTII,S$GLB,, | Performed by: STUDENT IN AN ORGANIZED HEALTH CARE EDUCATION/TRAINING PROGRAM

## 2023-12-29 PROCEDURE — 3051F HG A1C>EQUAL 7.0%<8.0%: CPT | Mod: CPTII,S$GLB,, | Performed by: STUDENT IN AN ORGANIZED HEALTH CARE EDUCATION/TRAINING PROGRAM

## 2023-12-29 PROCEDURE — 92083 EXTENDED VISUAL FIELD XM: CPT | Mod: S$GLB,,, | Performed by: STUDENT IN AN ORGANIZED HEALTH CARE EDUCATION/TRAINING PROGRAM

## 2023-12-29 PROCEDURE — 92133 POSTERIOR SEGMENT OCT OPTIC NERVE(OCULAR COHERENCE TOMOGRAPHY) - OU - BOTH EYES: ICD-10-PCS | Mod: S$GLB,,, | Performed by: STUDENT IN AN ORGANIZED HEALTH CARE EDUCATION/TRAINING PROGRAM

## 2023-12-29 PROCEDURE — 1160F PR REVIEW ALL MEDS BY PRESCRIBER/CLIN PHARMACIST DOCUMENTED: ICD-10-PCS | Mod: CPTII,S$GLB,, | Performed by: STUDENT IN AN ORGANIZED HEALTH CARE EDUCATION/TRAINING PROGRAM

## 2023-12-29 PROCEDURE — 1160F RVW MEDS BY RX/DR IN RCRD: CPT | Mod: CPTII,S$GLB,, | Performed by: STUDENT IN AN ORGANIZED HEALTH CARE EDUCATION/TRAINING PROGRAM

## 2023-12-29 PROCEDURE — 3066F NEPHROPATHY DOC TX: CPT | Mod: CPTII,S$GLB,, | Performed by: STUDENT IN AN ORGANIZED HEALTH CARE EDUCATION/TRAINING PROGRAM

## 2023-12-29 PROCEDURE — 3051F PR MOST RECENT HEMOGLOBIN A1C LEVEL 7.0 - < 8.0%: ICD-10-PCS | Mod: CPTII,S$GLB,, | Performed by: STUDENT IN AN ORGANIZED HEALTH CARE EDUCATION/TRAINING PROGRAM

## 2023-12-29 PROCEDURE — 99999 PR PBB SHADOW E&M-EST. PATIENT-LVL III: CPT | Mod: PBBFAC,,, | Performed by: STUDENT IN AN ORGANIZED HEALTH CARE EDUCATION/TRAINING PROGRAM

## 2023-12-29 PROCEDURE — 1159F PR MEDICATION LIST DOCUMENTED IN MEDICAL RECORD: ICD-10-PCS | Mod: CPTII,S$GLB,, | Performed by: STUDENT IN AN ORGANIZED HEALTH CARE EDUCATION/TRAINING PROGRAM

## 2023-12-29 PROCEDURE — 92133 CPTRZD OPH DX IMG PST SGM ON: CPT | Mod: S$GLB,,, | Performed by: STUDENT IN AN ORGANIZED HEALTH CARE EDUCATION/TRAINING PROGRAM

## 2023-12-29 PROCEDURE — 92083 HUMPHREY VISUAL FIELD - OU - BOTH EYES: ICD-10-PCS | Mod: S$GLB,,, | Performed by: STUDENT IN AN ORGANIZED HEALTH CARE EDUCATION/TRAINING PROGRAM

## 2023-12-29 PROCEDURE — 1159F MED LIST DOCD IN RCRD: CPT | Mod: CPTII,S$GLB,, | Performed by: STUDENT IN AN ORGANIZED HEALTH CARE EDUCATION/TRAINING PROGRAM

## 2023-12-29 PROCEDURE — 99214 OFFICE O/P EST MOD 30 MIN: CPT | Mod: S$GLB,,, | Performed by: STUDENT IN AN ORGANIZED HEALTH CARE EDUCATION/TRAINING PROGRAM

## 2024-01-23 DIAGNOSIS — E11.3291 TYPE 2 DIABETES MELLITUS WITH MILD NONPROLIFERATIVE RETINOPATHY OF RIGHT EYE, WITH LONG-TERM CURRENT USE OF INSULIN, MACULAR EDEMA PRESENCE UNSPECIFIED: ICD-10-CM

## 2024-01-23 DIAGNOSIS — Z79.4 TYPE 2 DIABETES MELLITUS WITH MILD NONPROLIFERATIVE RETINOPATHY OF RIGHT EYE, WITH LONG-TERM CURRENT USE OF INSULIN, MACULAR EDEMA PRESENCE UNSPECIFIED: ICD-10-CM

## 2024-01-23 RX ORDER — FLUCONAZOLE 150 MG/1
TABLET ORAL
Qty: 2 TABLET | Refills: 5 | Status: SHIPPED | OUTPATIENT
Start: 2024-01-23

## 2024-02-09 ENCOUNTER — OFFICE VISIT (OUTPATIENT)
Dept: OPHTHALMOLOGY | Facility: CLINIC | Age: 44
End: 2024-02-09
Payer: COMMERCIAL

## 2024-02-09 DIAGNOSIS — Z79.4 TYPE 2 DIABETES MELLITUS WITH BOTH EYES AFFECTED BY MODERATE NONPROLIFERATIVE RETINOPATHY WITHOUT MACULAR EDEMA, WITH LONG-TERM CURRENT USE OF INSULIN: Primary | ICD-10-CM

## 2024-02-09 DIAGNOSIS — E11.3393 TYPE 2 DIABETES MELLITUS WITH BOTH EYES AFFECTED BY MODERATE NONPROLIFERATIVE RETINOPATHY WITHOUT MACULAR EDEMA, WITH LONG-TERM CURRENT USE OF INSULIN: Primary | ICD-10-CM

## 2024-02-09 DIAGNOSIS — E11.36 DIABETIC CATARACT OF BOTH EYES: ICD-10-CM

## 2024-02-09 DIAGNOSIS — H40.003 GLAUCOMA SUSPECT OF BOTH EYES: ICD-10-CM

## 2024-02-09 PROCEDURE — 2022F DILAT RTA XM EVC RTNOPTHY: CPT | Mod: CPTII,S$GLB,, | Performed by: OPHTHALMOLOGY

## 2024-02-09 PROCEDURE — 92134 CPTRZ OPH DX IMG PST SGM RTA: CPT | Mod: S$GLB,,, | Performed by: OPHTHALMOLOGY

## 2024-02-09 PROCEDURE — 1159F MED LIST DOCD IN RCRD: CPT | Mod: CPTII,S$GLB,, | Performed by: OPHTHALMOLOGY

## 2024-02-09 PROCEDURE — 99999 PR PBB SHADOW E&M-EST. PATIENT-LVL III: CPT | Mod: PBBFAC,,, | Performed by: OPHTHALMOLOGY

## 2024-02-09 PROCEDURE — 1160F RVW MEDS BY RX/DR IN RCRD: CPT | Mod: CPTII,S$GLB,, | Performed by: OPHTHALMOLOGY

## 2024-02-09 PROCEDURE — 92014 COMPRE OPH EXAM EST PT 1/>: CPT | Mod: S$GLB,,, | Performed by: OPHTHALMOLOGY

## 2024-02-09 RX ORDER — MELOXICAM 15 MG/1
15 TABLET ORAL DAILY
COMMUNITY

## 2024-02-09 RX ORDER — CLINDAMYCIN HYDROCHLORIDE 300 MG/1
300 CAPSULE ORAL 4 TIMES DAILY
COMMUNITY
Start: 2023-09-25

## 2024-02-09 RX ORDER — INSULIN ASPART 100 [IU]/ML
100 INJECTION, SOLUTION INTRAVENOUS; SUBCUTANEOUS
COMMUNITY

## 2024-02-09 RX ORDER — TIRZEPATIDE 7.5 MG/.5ML
7.5 INJECTION, SOLUTION SUBCUTANEOUS WEEKLY
COMMUNITY
Start: 2023-10-09

## 2024-02-09 RX ORDER — INSULIN DETEMIR 100 [IU]/ML
100 INJECTION, SOLUTION SUBCUTANEOUS DAILY
COMMUNITY

## 2024-02-09 RX ORDER — PREDNISONE 20 MG/1
40 TABLET ORAL 2 TIMES DAILY
COMMUNITY
Start: 2023-09-25

## 2024-02-09 NOTE — PROGRESS NOTES
"Subjective:       Patient ID: Alejandrina Hernandez is a 43 y.o. female      No chief complaint on file.    History of Present Illness  HPI     OVERDUE: 6 mo DFE OU /OCTM OU    DLS 05/19/2023 by Dr. Bobo Lion MD    Cc: pt states: vision is still blurred Va OU     --eye pain ( h/o sinus)   --blurred VA OU  --diplopia  --flashes/floaters  --headaches  --veils/curtains/shadows    Eye Meds: NONE      POHx  1. Glaucoma Suspect OU  2. HX of Laser OU x 2   3. Type II DM w/ Mod. NPDR w/o ME    PMHx:   DM x "few years"  Pt not sure how long    Last edited by Bobo Lion MD on 2/9/2024 11:34 AM.        Imaging:    See report    Assessment/Plan:     1. Type 2 diabetes mellitus with both eyes affected by moderate nonproliferative retinopathy without macular edema, with long-term current use of insulin  CWS resolved today  Recommend eval with FA today reveals no sig ischemia and no NV    Diabetic Retinopathy discussed in detail, all questions answered  Stressed importance of good BS/BP/Chol Control  RTC immediately PRN any vision changes, hugh blurry vision, missing vision, floaters, distortions, etc    - OCT- Retina; Future  - Posterior Segment OCT Retina-Both eyes    2. Glaucoma suspect of both eyes  Keep f/u with Dr Ley as planned    3. Diabetic cataract of both eyes  Refraction    Follow up in about 6 months (around 8/9/2024), or if symptoms worsen or fail to improve, for Comprehensive Examination, OCT Mac.        "

## 2024-03-04 ENCOUNTER — TELEPHONE (OUTPATIENT)
Dept: ENDOCRINOLOGY | Facility: CLINIC | Age: 44
End: 2024-03-04
Payer: COMMERCIAL

## 2024-03-04 DIAGNOSIS — Z79.4 TYPE 2 DIABETES MELLITUS WITH MILD NONPROLIFERATIVE RETINOPATHY OF RIGHT EYE, WITH LONG-TERM CURRENT USE OF INSULIN, MACULAR EDEMA PRESENCE UNSPECIFIED: ICD-10-CM

## 2024-03-04 DIAGNOSIS — E11.3291 TYPE 2 DIABETES MELLITUS WITH MILD NONPROLIFERATIVE RETINOPATHY OF RIGHT EYE, WITH LONG-TERM CURRENT USE OF INSULIN, MACULAR EDEMA PRESENCE UNSPECIFIED: ICD-10-CM

## 2024-03-04 RX ORDER — INSULIN LISPRO 100 [IU]/ML
20 INJECTION, SOLUTION INTRAVENOUS; SUBCUTANEOUS
Qty: 18 ML | Refills: 11 | Status: SHIPPED | OUTPATIENT
Start: 2024-03-04 | End: 2025-03-04

## 2024-03-27 DIAGNOSIS — E11.9 TYPE 2 DIABETES MELLITUS WITHOUT COMPLICATION: ICD-10-CM

## 2024-04-05 ENCOUNTER — OFFICE VISIT (OUTPATIENT)
Dept: ENDOCRINOLOGY | Facility: CLINIC | Age: 44
End: 2024-04-05
Payer: COMMERCIAL

## 2024-04-05 ENCOUNTER — LAB VISIT (OUTPATIENT)
Dept: LAB | Facility: HOSPITAL | Age: 44
End: 2024-04-05
Attending: STUDENT IN AN ORGANIZED HEALTH CARE EDUCATION/TRAINING PROGRAM
Payer: COMMERCIAL

## 2024-04-05 VITALS — BODY MASS INDEX: 35.19 KG/M2 | WEIGHT: 218 LBS

## 2024-04-05 DIAGNOSIS — E78.2 MIXED HYPERLIPIDEMIA: ICD-10-CM

## 2024-04-05 DIAGNOSIS — Z79.4 TYPE 2 DIABETES MELLITUS WITH MILD NONPROLIFERATIVE RETINOPATHY OF RIGHT EYE, WITH LONG-TERM CURRENT USE OF INSULIN, MACULAR EDEMA PRESENCE UNSPECIFIED: Primary | ICD-10-CM

## 2024-04-05 DIAGNOSIS — E11.3291 TYPE 2 DIABETES MELLITUS WITH MILD NONPROLIFERATIVE RETINOPATHY OF RIGHT EYE, WITH LONG-TERM CURRENT USE OF INSULIN, MACULAR EDEMA PRESENCE UNSPECIFIED: ICD-10-CM

## 2024-04-05 DIAGNOSIS — E11.3291 TYPE 2 DIABETES MELLITUS WITH MILD NONPROLIFERATIVE RETINOPATHY OF RIGHT EYE, WITH LONG-TERM CURRENT USE OF INSULIN, MACULAR EDEMA PRESENCE UNSPECIFIED: Primary | ICD-10-CM

## 2024-04-05 DIAGNOSIS — E03.4 HYPOTHYROIDISM DUE TO ACQUIRED ATROPHY OF THYROID: ICD-10-CM

## 2024-04-05 DIAGNOSIS — Z79.4 TYPE 2 DIABETES MELLITUS WITH MILD NONPROLIFERATIVE RETINOPATHY OF RIGHT EYE, WITH LONG-TERM CURRENT USE OF INSULIN, MACULAR EDEMA PRESENCE UNSPECIFIED: ICD-10-CM

## 2024-04-05 DIAGNOSIS — E11.9 TYPE 2 DIABETES MELLITUS WITHOUT COMPLICATION: ICD-10-CM

## 2024-04-05 LAB
25(OH)D3+25(OH)D2 SERPL-MCNC: 27 NG/ML (ref 30–96)
ESTIMATED AVG GLUCOSE: 171 MG/DL (ref 68–131)
ESTIMATED AVG GLUCOSE: 171 MG/DL (ref 68–131)
HBA1C MFR BLD: 7.6 % (ref 4–5.6)
HBA1C MFR BLD: 7.6 % (ref 4–5.6)
T4 FREE SERPL-MCNC: 0.99 NG/DL (ref 0.71–1.51)
TSH SERPL DL<=0.005 MIU/L-ACNC: 0.31 UIU/ML (ref 0.4–4)

## 2024-04-05 PROCEDURE — 36415 COLL VENOUS BLD VENIPUNCTURE: CPT | Performed by: STUDENT IN AN ORGANIZED HEALTH CARE EDUCATION/TRAINING PROGRAM

## 2024-04-05 PROCEDURE — 83036 HEMOGLOBIN GLYCOSYLATED A1C: CPT | Performed by: FAMILY MEDICINE

## 2024-04-05 PROCEDURE — 99214 OFFICE O/P EST MOD 30 MIN: CPT | Mod: 25,S$GLB,, | Performed by: STUDENT IN AN ORGANIZED HEALTH CARE EDUCATION/TRAINING PROGRAM

## 2024-04-05 PROCEDURE — 95251 CONT GLUC MNTR ANALYSIS I&R: CPT | Mod: S$GLB,,, | Performed by: STUDENT IN AN ORGANIZED HEALTH CARE EDUCATION/TRAINING PROGRAM

## 2024-04-05 PROCEDURE — 1159F MED LIST DOCD IN RCRD: CPT | Mod: CPTII,S$GLB,, | Performed by: STUDENT IN AN ORGANIZED HEALTH CARE EDUCATION/TRAINING PROGRAM

## 2024-04-05 PROCEDURE — 84443 ASSAY THYROID STIM HORMONE: CPT | Performed by: STUDENT IN AN ORGANIZED HEALTH CARE EDUCATION/TRAINING PROGRAM

## 2024-04-05 PROCEDURE — 99999 PR PBB SHADOW E&M-EST. PATIENT-LVL IV: CPT | Mod: PBBFAC,,, | Performed by: STUDENT IN AN ORGANIZED HEALTH CARE EDUCATION/TRAINING PROGRAM

## 2024-04-05 PROCEDURE — 3008F BODY MASS INDEX DOCD: CPT | Mod: CPTII,S$GLB,, | Performed by: STUDENT IN AN ORGANIZED HEALTH CARE EDUCATION/TRAINING PROGRAM

## 2024-04-05 PROCEDURE — 1160F RVW MEDS BY RX/DR IN RCRD: CPT | Mod: CPTII,S$GLB,, | Performed by: STUDENT IN AN ORGANIZED HEALTH CARE EDUCATION/TRAINING PROGRAM

## 2024-04-05 PROCEDURE — 82306 VITAMIN D 25 HYDROXY: CPT | Performed by: STUDENT IN AN ORGANIZED HEALTH CARE EDUCATION/TRAINING PROGRAM

## 2024-04-05 PROCEDURE — 84439 ASSAY OF FREE THYROXINE: CPT | Performed by: FAMILY MEDICINE

## 2024-04-05 NOTE — ASSESSMENT & PLAN NOTE
Controlled with TIR ~80% and GMI 6.8%. She has prandial hyperglycemia depending on intake. She has been timing Humalog after meals, instructed to use lower doses as she has had hypoglycemia with these boluses.    She has been using a lower dose of Toujeo which has kept fasting glucose at goal, ok to continue.    Continue SGLT-2 inhibitor and GLP-1/GIP RA    Plan  - Continue Toujeo 40 U HS  - Decrease Humalog to SSI 4 times daily  - Continue Mounjaro 10 mg weekly  - Continue Jardiance 25 mg daily  - Continue FreeStyle Maritza 3 CGM    Recheck A1c    F/u 4 months

## 2024-04-05 NOTE — PROGRESS NOTES
Subjective:      Patient ID: Alejandrina Hernandez is a 43 y.o. female.    Chief Complaint:  Type 2 diabetes    History of Present Illness  This is a 43 y.o. female. with a past medical history of DM2, HLD, HTN, obesity here for DM2.    Type 2 diabetes mellitus  Diagnosed in late 20s    Current diabetic medications:  - Toujeo 40 U HS   - Humalog 10-20 U AC depending on meal size  - Jardiance 25 mg daily  - Mounjaro 10 mg weekly    Past diabetes medications:  - Metformin - diarrhea  - Trulicity    Lab Results   Component Value Date    CREATININE 0.9 02/08/2024    EGFRNORACEVR >60.0 02/08/2024         Known diabetic complications: retinopathy    Weight trend: stable  Prior visit with diabetes education: yes    Current diet: 2 meals usually, sometimes 3  Current exercise: Limited with back pain    Wt Readings from Last 10 Encounters:   04/05/24 98.9 kg (218 lb)   02/16/24 100.2 kg (221 lb)   02/16/24 100.5 kg (221 lb 8 oz)   02/12/24 106.1 kg (234 lb)   06/30/23 106.1 kg (234 lb)   06/19/23 104.3 kg (230 lb)   05/16/23 113 kg (249 lb 1.6 oz)   01/20/23 114 kg (251 lb 5.2 oz)   11/16/22 111.1 kg (245 lb)   10/21/22 114.5 kg (252 lb 6 oz)           Mother DM2  Maternal grandfather DM2  Paternal grandmother DM2  A lot of cousins and aunts with DM2    Diabetes Management Status  Statin: Taking  ACE/ARB: Not taking    Screening or Prevention Patient's value   HgA1C Testing and Control   Lab Results   Component Value Date    HGBA1C 7.1 (H) 09/05/2023        LDL control Lab Results   Component Value Date    LDLCALC 84.8 02/08/2024      Nephropathy screening Lab Results   Component Value Date    MICALBCREAT 21.2 09/05/2023              Hypothyroidism  Currently on levothyroxine 125 mcg daily  Reports takes 30 min before eating or drinking anything other than water.     Lab Results   Component Value Date    TSH 0.533 09/05/2023         Review of Systems  As above    Social and family history reviewed  Current medications and  allergies reviewed    Objective:   Wt 98.9 kg (218 lb)   BMI 35.19 kg/m²   Physical Exam   Some areas of lipodystrophy in abdominal region      BP Readings from Last 1 Encounters:   02/16/24 130/76      Wt Readings from Last 1 Encounters:   04/05/24 1248 98.9 kg (218 lb)     Body mass index is 35.19 kg/m².    Lab Review:   Lab Results   Component Value Date    HGBA1C 7.1 (H) 09/05/2023     Lab Results   Component Value Date    CHOL 152 02/08/2024    HDL 47 02/08/2024    LDLCALC 84.8 02/08/2024    TRIG 101 02/08/2024    CHOLHDL 30.9 02/08/2024     Lab Results   Component Value Date     (L) 02/08/2024    K 4.2 02/08/2024     02/08/2024    CO2 22 (L) 02/08/2024     (H) 02/08/2024    BUN 18 02/08/2024    CREATININE 0.9 02/08/2024    CALCIUM 9.7 02/08/2024    PROT 7.7 02/08/2024    ALBUMIN 4.3 02/08/2024    BILITOT 0.5 02/08/2024    ALKPHOS 54 (L) 02/08/2024    AST 21 02/08/2024    ALT 31 02/08/2024    ANIONGAP 9 02/08/2024    ESTGFRAFRICA >60.0 01/28/2022    EGFRNONAA >60.0 01/28/2022    TSH 0.533 09/05/2023       All pertinent labs reviewed    Assessment and Plan     Type 2 diabetes mellitus with mild nonproliferative retinopathy of right eye, with long-term current use of insulin  Controlled with TIR ~80% and GMI 6.8%. She has prandial hyperglycemia depending on intake. She has been timing Humalog after meals, instructed to use lower doses as she has had hypoglycemia with these boluses.    She has been using a lower dose of Toujeo which has kept fasting glucose at goal, ok to continue.    Continue SGLT-2 inhibitor and GLP-1/GIP RA    Plan  - Continue Toujeo 40 U HS  - Decrease Humalog to SSI 4 times daily  - Continue Mounjaro 10 mg weekly  - Continue Jardiance 25 mg daily  - Continue FreeStyle Maritza 3 CGM    Recheck A1c    F/u 4 months    BMI 35.0-35.9,adult  Continue GLP-1/GIP RA given weight loss benefit    Hypothyroidism due to acquired atrophy of thyroid  Last TSH at goal for age  Continue  levothyroxine 125 mcg daily    Recheck TSH - requirements might have changed since she lost weight    Mixed hyperlipidemia  Continue statin  Monitored by PCP        Mendez Roberts MD  Endocrinology

## 2024-04-05 NOTE — PATIENT INSTRUCTIONS
If the patch that you have for Maritza works, you can get on [employer]: Lexcam Maritza 3 patches    Decrease Toujeo to 40 units every nigh    Use Humalog only as needed for elevations after meals    Continue Mounjaro and Jardiance

## 2024-04-05 NOTE — ASSESSMENT & PLAN NOTE
Last TSH at goal for age  Continue levothyroxine 125 mcg daily    Recheck TSH - requirements might have changed since she lost weight

## 2024-04-08 ENCOUNTER — TELEPHONE (OUTPATIENT)
Dept: ENDOCRINOLOGY | Facility: CLINIC | Age: 44
End: 2024-04-08
Payer: COMMERCIAL

## 2024-04-08 DIAGNOSIS — E55.9 VITAMIN D DEFICIENCY: ICD-10-CM

## 2024-04-08 DIAGNOSIS — E11.3291 TYPE 2 DIABETES MELLITUS WITH MILD NONPROLIFERATIVE RETINOPATHY OF RIGHT EYE, WITH LONG-TERM CURRENT USE OF INSULIN, MACULAR EDEMA PRESENCE UNSPECIFIED: Primary | ICD-10-CM

## 2024-04-08 DIAGNOSIS — Z79.4 TYPE 2 DIABETES MELLITUS WITH MILD NONPROLIFERATIVE RETINOPATHY OF RIGHT EYE, WITH LONG-TERM CURRENT USE OF INSULIN, MACULAR EDEMA PRESENCE UNSPECIFIED: Primary | ICD-10-CM

## 2024-04-08 NOTE — TELEPHONE ENCOUNTER
Patient notified of results, verbalized understanding.      ----- Message from Mendez Roberts MD sent at 4/8/2024  7:37 AM CDT -----  Please inform that I reviewed results.    A1c is 7.6 which is stable and in a good range. Her Maritza shows better numbers so I will follow Maritza more.     Thyroid levels ok, continue same dose of thyroid meds.    Vitamin D is low, please buy an over the counter vitamin D3 supplement and take at least 5,000 unit daily    Fasting labs in 3 months

## 2024-04-30 ENCOUNTER — PATIENT OUTREACH (OUTPATIENT)
Dept: ADMINISTRATIVE | Facility: HOSPITAL | Age: 44
End: 2024-04-30
Payer: COMMERCIAL

## 2024-05-09 DIAGNOSIS — Z79.4 TYPE 2 DIABETES MELLITUS WITH MILD NONPROLIFERATIVE RETINOPATHY OF RIGHT EYE, WITH LONG-TERM CURRENT USE OF INSULIN, MACULAR EDEMA PRESENCE UNSPECIFIED: ICD-10-CM

## 2024-05-09 DIAGNOSIS — E11.3291 TYPE 2 DIABETES MELLITUS WITH MILD NONPROLIFERATIVE RETINOPATHY OF RIGHT EYE, WITH LONG-TERM CURRENT USE OF INSULIN, MACULAR EDEMA PRESENCE UNSPECIFIED: ICD-10-CM

## 2024-05-09 RX ORDER — PEN NEEDLE, DIABETIC 31 GX5/16"
NEEDLE, DISPOSABLE MISCELLANEOUS
Qty: 150 EACH | Refills: 11 | Status: CANCELLED | OUTPATIENT
Start: 2024-05-09

## 2024-05-10 DIAGNOSIS — Z79.4 TYPE 2 DIABETES MELLITUS WITH MILD NONPROLIFERATIVE RETINOPATHY OF RIGHT EYE, WITH LONG-TERM CURRENT USE OF INSULIN, MACULAR EDEMA PRESENCE UNSPECIFIED: ICD-10-CM

## 2024-05-10 DIAGNOSIS — E11.3291 TYPE 2 DIABETES MELLITUS WITH MILD NONPROLIFERATIVE RETINOPATHY OF RIGHT EYE, WITH LONG-TERM CURRENT USE OF INSULIN, MACULAR EDEMA PRESENCE UNSPECIFIED: ICD-10-CM

## 2024-05-10 RX ORDER — PEN NEEDLE, DIABETIC 31 GX5/16"
NEEDLE, DISPOSABLE MISCELLANEOUS
Qty: 150 EACH | Refills: 11 | Status: SHIPPED | OUTPATIENT
Start: 2024-05-10

## 2024-05-27 DIAGNOSIS — E11.3291 TYPE 2 DIABETES MELLITUS WITH MILD NONPROLIFERATIVE RETINOPATHY OF RIGHT EYE, WITH LONG-TERM CURRENT USE OF INSULIN, MACULAR EDEMA PRESENCE UNSPECIFIED: ICD-10-CM

## 2024-05-27 DIAGNOSIS — Z79.4 TYPE 2 DIABETES MELLITUS WITH MILD NONPROLIFERATIVE RETINOPATHY OF RIGHT EYE, WITH LONG-TERM CURRENT USE OF INSULIN, MACULAR EDEMA PRESENCE UNSPECIFIED: ICD-10-CM

## 2024-06-14 RX ORDER — TIRZEPATIDE 7.5 MG/.5ML
7.5 INJECTION, SOLUTION SUBCUTANEOUS WEEKLY
Status: CANCELLED | OUTPATIENT
Start: 2024-06-14

## 2024-07-19 DIAGNOSIS — E11.3291 TYPE 2 DIABETES MELLITUS WITH MILD NONPROLIFERATIVE RETINOPATHY OF RIGHT EYE, WITH LONG-TERM CURRENT USE OF INSULIN, MACULAR EDEMA PRESENCE UNSPECIFIED: ICD-10-CM

## 2024-07-19 DIAGNOSIS — Z79.4 TYPE 2 DIABETES MELLITUS WITH MILD NONPROLIFERATIVE RETINOPATHY OF RIGHT EYE, WITH LONG-TERM CURRENT USE OF INSULIN, MACULAR EDEMA PRESENCE UNSPECIFIED: ICD-10-CM

## 2024-07-19 RX ORDER — FLUCONAZOLE 150 MG/1
TABLET ORAL
Qty: 2 TABLET | Refills: 5 | Status: SHIPPED | OUTPATIENT
Start: 2024-07-19

## 2024-08-02 DIAGNOSIS — E11.3291 TYPE 2 DIABETES MELLITUS WITH MILD NONPROLIFERATIVE RETINOPATHY OF RIGHT EYE, WITH LONG-TERM CURRENT USE OF INSULIN, MACULAR EDEMA PRESENCE UNSPECIFIED: ICD-10-CM

## 2024-08-02 DIAGNOSIS — Z79.4 TYPE 2 DIABETES MELLITUS WITH MILD NONPROLIFERATIVE RETINOPATHY OF RIGHT EYE, WITH LONG-TERM CURRENT USE OF INSULIN, MACULAR EDEMA PRESENCE UNSPECIFIED: ICD-10-CM

## 2024-08-03 RX ORDER — INSULIN GLARGINE 300 [IU]/ML
60 INJECTION, SOLUTION SUBCUTANEOUS DAILY
Qty: 4 PEN | Refills: 11 | Status: SHIPPED | OUTPATIENT
Start: 2024-08-03 | End: 2025-08-03

## 2025-01-08 ENCOUNTER — TELEPHONE (OUTPATIENT)
Dept: OPHTHALMOLOGY | Facility: CLINIC | Age: 45
End: 2025-01-08
Payer: COMMERCIAL

## 2025-02-10 ENCOUNTER — OFFICE VISIT (OUTPATIENT)
Dept: OPHTHALMOLOGY | Facility: CLINIC | Age: 45
End: 2025-02-10
Payer: COMMERCIAL

## 2025-02-10 ENCOUNTER — CLINICAL SUPPORT (OUTPATIENT)
Dept: OPHTHALMOLOGY | Facility: CLINIC | Age: 45
End: 2025-02-10
Payer: COMMERCIAL

## 2025-02-10 ENCOUNTER — TELEPHONE (OUTPATIENT)
Dept: OPTOMETRY | Facility: CLINIC | Age: 45
End: 2025-02-10
Payer: COMMERCIAL

## 2025-02-10 DIAGNOSIS — E11.3211 TYPE 2 DIABETES MELLITUS WITH RIGHT EYE AFFECTED BY MILD NONPROLIFERATIVE RETINOPATHY AND MACULAR EDEMA, WITH LONG-TERM CURRENT USE OF INSULIN: ICD-10-CM

## 2025-02-10 DIAGNOSIS — Z79.4 TYPE 2 DIABETES MELLITUS WITH RIGHT EYE AFFECTED BY MILD NONPROLIFERATIVE RETINOPATHY AND MACULAR EDEMA, WITH LONG-TERM CURRENT USE OF INSULIN: ICD-10-CM

## 2025-02-10 DIAGNOSIS — E11.3211 TYPE 2 DIABETES MELLITUS WITH RIGHT EYE AFFECTED BY MILD NONPROLIFERATIVE RETINOPATHY AND MACULAR EDEMA, WITH LONG-TERM CURRENT USE OF INSULIN: Primary | ICD-10-CM

## 2025-02-10 DIAGNOSIS — Z79.4 TYPE 2 DIABETES MELLITUS WITH RIGHT EYE AFFECTED BY MILD NONPROLIFERATIVE RETINOPATHY AND MACULAR EDEMA, WITH LONG-TERM CURRENT USE OF INSULIN: Primary | ICD-10-CM

## 2025-02-10 DIAGNOSIS — Z79.4 TYPE 2 DIABETES MELLITUS WITH BOTH EYES AFFECTED BY MODERATE NONPROLIFERATIVE RETINOPATHY WITHOUT MACULAR EDEMA, WITH LONG-TERM CURRENT USE OF INSULIN: ICD-10-CM

## 2025-02-10 DIAGNOSIS — E11.3393 TYPE 2 DIABETES MELLITUS WITH BOTH EYES AFFECTED BY MODERATE NONPROLIFERATIVE RETINOPATHY WITHOUT MACULAR EDEMA, WITH LONG-TERM CURRENT USE OF INSULIN: ICD-10-CM

## 2025-02-10 PROCEDURE — 1160F RVW MEDS BY RX/DR IN RCRD: CPT | Mod: CPTII,S$GLB,, | Performed by: OPHTHALMOLOGY

## 2025-02-10 PROCEDURE — 3051F HG A1C>EQUAL 7.0%<8.0%: CPT | Mod: CPTII,S$GLB,, | Performed by: OPHTHALMOLOGY

## 2025-02-10 PROCEDURE — 3066F NEPHROPATHY DOC TX: CPT | Mod: CPTII,S$GLB,, | Performed by: OPHTHALMOLOGY

## 2025-02-10 PROCEDURE — 1159F MED LIST DOCD IN RCRD: CPT | Mod: CPTII,S$GLB,, | Performed by: OPHTHALMOLOGY

## 2025-02-10 PROCEDURE — 92134 CPTRZ OPH DX IMG PST SGM RTA: CPT | Mod: S$GLB,,, | Performed by: OPHTHALMOLOGY

## 2025-02-10 PROCEDURE — 3060F POS MICROALBUMINURIA REV: CPT | Mod: CPTII,S$GLB,, | Performed by: OPHTHALMOLOGY

## 2025-02-10 PROCEDURE — 2022F DILAT RTA XM EVC RTNOPTHY: CPT | Mod: CPTII,S$GLB,, | Performed by: OPHTHALMOLOGY

## 2025-02-10 PROCEDURE — 92014 COMPRE OPH EXAM EST PT 1/>: CPT | Mod: S$GLB,,, | Performed by: OPHTHALMOLOGY

## 2025-02-10 PROCEDURE — 99999 PR PBB SHADOW E&M-EST. PATIENT-LVL III: CPT | Mod: PBBFAC,,, | Performed by: OPHTHALMOLOGY

## 2025-02-10 NOTE — PROGRESS NOTES
"Subjective:       Patient ID: Alejandrina Hernandez is a 44 y.o. female      Chief Complaint   Patient presents with    Follow-up     History of Present Illness  HPI    Overdue Oct/Dfe     Pt states her vision is blurry, she reports feeling a sharp "shock" in   both of her eyes from time to time and states it can be painful.   Pt denies any pain today.   Message was sent over to Dr. Araiza for iop and glasses renewal today.     -Flashes   -Floaters   -Pain     No gtts     Hemoglobin A1C       Date                     Value               Ref Range             Status                02/05/2025               7.4 (H)             4.0 - 5.6 %           Final                Last edited by Bobo Lion MD on 2/10/2025  2:43 PM.        Imaging:    See report    Assessment/Plan:     1. Type 2 diabetes mellitus with both eyes affected by moderate nonproliferative retinopathy without macular edema, with long-term current use of insulin  Prev CWS resolved  Prior eval with FA revealed no sig ischemia and no NV    Diabetic Retinopathy discussed in detail, all questions answered  Stressed importance of good BS/BP/Chol Control  RTC immediately PRN any vision changes, hugh blurry vision, missing vision, floaters, distortions, etc    - OCT- Retina; Future  - Posterior Segment OCT Retina-Both eyes    2. Glaucoma suspect of both eyes  Reestablish with gl service.  Prev pt of Dr Ley    3. Diabetic cataract of both eyes  Refraction    Dry eye symptoms intermittently.  Discussed.  ATs PRN    Follow up in about 6 months (around 8/10/2025), or if symptoms worsen or fail to improve, for Comprehensive Examination (DILATE OU), OCT Mac.        "

## 2025-02-10 NOTE — TELEPHONE ENCOUNTER
----- Message from Tech Lila sent at 2/10/2025  1:40 PM CST -----  Pt is here in clinic now would like a call in about an hour to schedule her [facility]

## 2025-02-19 DIAGNOSIS — E11.3291 TYPE 2 DIABETES MELLITUS WITH MILD NONPROLIFERATIVE RETINOPATHY OF RIGHT EYE, WITH LONG-TERM CURRENT USE OF INSULIN, MACULAR EDEMA PRESENCE UNSPECIFIED: ICD-10-CM

## 2025-02-19 DIAGNOSIS — Z79.4 TYPE 2 DIABETES MELLITUS WITH MILD NONPROLIFERATIVE RETINOPATHY OF RIGHT EYE, WITH LONG-TERM CURRENT USE OF INSULIN, MACULAR EDEMA PRESENCE UNSPECIFIED: ICD-10-CM

## 2025-02-19 RX ORDER — FLUCONAZOLE 150 MG/1
TABLET ORAL
Qty: 2 TABLET | Refills: 5 | Status: SHIPPED | OUTPATIENT
Start: 2025-02-19

## 2025-03-07 ENCOUNTER — TELEPHONE (OUTPATIENT)
Dept: OPTOMETRY | Facility: CLINIC | Age: 45
End: 2025-03-07
Payer: COMMERCIAL

## 2025-03-10 ENCOUNTER — OFFICE VISIT (OUTPATIENT)
Dept: OPTOMETRY | Facility: CLINIC | Age: 45
End: 2025-03-10
Payer: COMMERCIAL

## 2025-03-10 DIAGNOSIS — H53.10 SUBJECTIVE VISUAL DISTURBANCE: Primary | ICD-10-CM

## 2025-03-10 DIAGNOSIS — H25.13 SENILE NUCLEAR CATARACT, BILATERAL: ICD-10-CM

## 2025-03-10 DIAGNOSIS — H52.4 MYOPIA OF BOTH EYES WITH ASTIGMATISM AND PRESBYOPIA: ICD-10-CM

## 2025-03-10 DIAGNOSIS — H52.203 MYOPIA OF BOTH EYES WITH ASTIGMATISM AND PRESBYOPIA: ICD-10-CM

## 2025-03-10 DIAGNOSIS — H52.13 MYOPIA OF BOTH EYES WITH ASTIGMATISM AND PRESBYOPIA: ICD-10-CM

## 2025-03-10 DIAGNOSIS — H53.8 BLURRY VISION, BILATERAL: ICD-10-CM

## 2025-03-10 PROCEDURE — 3051F HG A1C>EQUAL 7.0%<8.0%: CPT | Mod: CPTII,S$GLB,, | Performed by: OPTOMETRIST

## 2025-03-10 PROCEDURE — 99999 PR PBB SHADOW E&M-EST. PATIENT-LVL II: CPT | Mod: PBBFAC,,, | Performed by: OPTOMETRIST

## 2025-03-10 PROCEDURE — 99212 OFFICE O/P EST SF 10 MIN: CPT | Mod: S$GLB,,, | Performed by: OPTOMETRIST

## 2025-03-10 PROCEDURE — 1159F MED LIST DOCD IN RCRD: CPT | Mod: CPTII,S$GLB,, | Performed by: OPTOMETRIST

## 2025-03-10 PROCEDURE — 3066F NEPHROPATHY DOC TX: CPT | Mod: CPTII,S$GLB,, | Performed by: OPTOMETRIST

## 2025-03-10 PROCEDURE — 3060F POS MICROALBUMINURIA REV: CPT | Mod: CPTII,S$GLB,, | Performed by: OPTOMETRIST

## 2025-03-10 PROCEDURE — 92015 DETERMINE REFRACTIVE STATE: CPT | Mod: S$GLB,,, | Performed by: OPTOMETRIST

## 2025-03-10 NOTE — PROGRESS NOTES
HPI    DLS: 2/10/2025, Dr. Lion    45 yo F presents today for MRX + IOP check per Dr. Lion. Pt reports   no new vision complaints.    Eye Meds: none    FOHx: glaucoma, mom  Last edited by Nusrat Meza MA on 3/10/2025 10:10 AM.            Assessment /Plan     For exam results, see Encounter Report.    Subjective visual disturbance    Myopia of both eyes with astigmatism and presbyopia    Senile nuclear cataract, bilateral    Blurry vision, bilateral      MONITOR. ED PT ON ALL EXAM FINDINGS.  RX FINAL SPEC  MILD NS OU; PRESURGICAL; MONITOR.  H/O OF TYPE 2 DM RETINOPATHY; CONTINUE TO FOLLOW WITH OPHTHALMOLOGY  NORMOTENSIVE IOP; MONITOR  RTC 1YR//PRN FOR REE

## 2025-03-18 DIAGNOSIS — E11.3291 TYPE 2 DIABETES MELLITUS WITH MILD NONPROLIFERATIVE RETINOPATHY OF RIGHT EYE, WITH LONG-TERM CURRENT USE OF INSULIN, MACULAR EDEMA PRESENCE UNSPECIFIED: ICD-10-CM

## 2025-03-18 DIAGNOSIS — Z79.4 TYPE 2 DIABETES MELLITUS WITH MILD NONPROLIFERATIVE RETINOPATHY OF RIGHT EYE, WITH LONG-TERM CURRENT USE OF INSULIN, MACULAR EDEMA PRESENCE UNSPECIFIED: ICD-10-CM

## 2025-03-18 RX ORDER — INSULIN LISPRO 100 [IU]/ML
20 INJECTION, SOLUTION INTRAVENOUS; SUBCUTANEOUS
Qty: 18 ML | Refills: 11 | Status: SHIPPED | OUTPATIENT
Start: 2025-03-18 | End: 2026-03-18

## 2025-04-03 ENCOUNTER — PATIENT MESSAGE (OUTPATIENT)
Dept: ADMINISTRATIVE | Facility: HOSPITAL | Age: 45
End: 2025-04-03
Payer: COMMERCIAL

## 2025-05-14 DIAGNOSIS — E11.3291 TYPE 2 DIABETES MELLITUS WITH MILD NONPROLIFERATIVE RETINOPATHY OF RIGHT EYE, WITH LONG-TERM CURRENT USE OF INSULIN, MACULAR EDEMA PRESENCE UNSPECIFIED: Primary | ICD-10-CM

## 2025-05-14 DIAGNOSIS — Z79.4 TYPE 2 DIABETES MELLITUS WITH MILD NONPROLIFERATIVE RETINOPATHY OF RIGHT EYE, WITH LONG-TERM CURRENT USE OF INSULIN, MACULAR EDEMA PRESENCE UNSPECIFIED: Primary | ICD-10-CM

## 2025-05-14 RX ORDER — PEN NEEDLE, DIABETIC 30 GX3/16"
1 NEEDLE, DISPOSABLE MISCELLANEOUS
Qty: 150 EACH | Refills: 11 | Status: SHIPPED | OUTPATIENT
Start: 2025-05-14

## 2025-05-16 ENCOUNTER — OFFICE VISIT (OUTPATIENT)
Dept: ENDOCRINOLOGY | Facility: CLINIC | Age: 45
End: 2025-05-16
Payer: COMMERCIAL

## 2025-05-16 VITALS
BODY MASS INDEX: 36.21 KG/M2 | SYSTOLIC BLOOD PRESSURE: 120 MMHG | HEIGHT: 65 IN | DIASTOLIC BLOOD PRESSURE: 80 MMHG | WEIGHT: 217.31 LBS

## 2025-05-16 DIAGNOSIS — E03.4 HYPOTHYROIDISM DUE TO ACQUIRED ATROPHY OF THYROID: ICD-10-CM

## 2025-05-16 DIAGNOSIS — E78.2 MIXED HYPERLIPIDEMIA: ICD-10-CM

## 2025-05-16 DIAGNOSIS — E11.3291 TYPE 2 DIABETES MELLITUS WITH MILD NONPROLIFERATIVE RETINOPATHY OF RIGHT EYE, WITH LONG-TERM CURRENT USE OF INSULIN, MACULAR EDEMA PRESENCE UNSPECIFIED: Primary | ICD-10-CM

## 2025-05-16 DIAGNOSIS — Z79.4 TYPE 2 DIABETES MELLITUS WITH MILD NONPROLIFERATIVE RETINOPATHY OF RIGHT EYE, WITH LONG-TERM CURRENT USE OF INSULIN, MACULAR EDEMA PRESENCE UNSPECIFIED: Primary | ICD-10-CM

## 2025-05-16 PROCEDURE — 99999 PR PBB SHADOW E&M-EST. PATIENT-LVL III: CPT | Mod: PBBFAC,,, | Performed by: STUDENT IN AN ORGANIZED HEALTH CARE EDUCATION/TRAINING PROGRAM

## 2025-05-16 RX ORDER — EMPAGLIFLOZIN 10 MG/1
10 TABLET, FILM COATED ORAL DAILY
COMMUNITY
Start: 2025-04-24

## 2025-05-16 RX ORDER — BLOOD-GLUCOSE SENSOR
EACH MISCELLANEOUS
Qty: 2 EACH | Refills: 11 | Status: SHIPPED | OUTPATIENT
Start: 2025-05-16

## 2025-05-16 NOTE — PROGRESS NOTES
Subjective:      Patient ID: Alejandrina Hernandez is a 44 y.o. female.    Chief Complaint:  Type 2 diabetes    History of Present Illness  This is a 44 y.o. female. with a past medical history of DM2, HLD, HTN, obesity here for DM2.    Type 2 diabetes mellitus  Diagnosed in late 20s    Current diabetic medications:  - Toujeo 60 U HS   - Humalog 20 U AC  - Jardiance 10 mg daily  - Mounjaro 12.5 mg weekly    Past diabetes medications:  - Metformin - diarrhea  - Trulicity    Lab Results   Component Value Date    CREATININE 0.7 02/05/2025    EGFRNORACEVR >60.0 02/05/2025         Known diabetic complications: retinopathy    Weight trend: stable  Prior visit with diabetes education: yes    Current diet: 2 meals usually, sometimes 3  Current exercise: Limited with back pain    Wt Readings from Last 10 Encounters:   05/16/25 98.6 kg (217 lb 4.8 oz)   05/14/25 97.9 kg (215 lb 13.3 oz)   05/08/25 98.4 kg (217 lb)   03/11/25 97.6 kg (215 lb 2.7 oz)   03/11/25 97.6 kg (215 lb 3.2 oz)   02/14/25 100.7 kg (222 lb)   01/14/25 100.7 kg (222 lb)   10/07/24 99.8 kg (220 lb)   04/09/24 98.4 kg (217 lb)   04/05/24 98.9 kg (218 lb)         Mother DM2  Maternal grandfather DM2  Paternal grandmother DM2  A lot of cousins and aunts with DM2        Screening or Prevention Patient's value   HgA1C Testing and Control   Lab Results   Component Value Date    HGBA1C 7.4 (H) 02/05/2025        LDL control Lab Results   Component Value Date    LDLCALC 62.8 (L) 02/05/2025      Nephropathy screening Lab Results   Component Value Date    MICALBCREAT 53.2 (H) 02/05/2025              Hypothyroidism  Currently on levothyroxine 137 mcg daily  Reports takes 30 min before eating or drinking anything other than water.     Lab Results   Component Value Date    TSH 7.621 (H) 02/05/2025         Review of Systems  As above    Social and family history reviewed  Current medications and allergies reviewed    Objective:   /80 (BP Location: Right arm, Patient  "Position: Sitting)   Ht 5' 5" (1.651 m)   Wt 98.6 kg (217 lb 4.8 oz)   BMI 36.16 kg/m²   Physical Exam   Some areas of lipodystrophy in abdominal region      BP Readings from Last 1 Encounters:   05/16/25 120/80      Wt Readings from Last 1 Encounters:   05/16/25 0838 98.6 kg (217 lb 4.8 oz)     Body mass index is 36.16 kg/m².    Lab Review:   Lab Results   Component Value Date    HGBA1C 7.4 (H) 02/05/2025     Lab Results   Component Value Date    CHOL 119 (L) 02/05/2025    HDL 39 (L) 02/05/2025    LDLCALC 62.8 (L) 02/05/2025    TRIG 86 02/05/2025    CHOLHDL 32.8 02/05/2025     Lab Results   Component Value Date     02/05/2025    K 3.5 02/05/2025     02/05/2025    CO2 24 02/05/2025     02/05/2025    BUN 11 02/05/2025    CREATININE 0.7 02/05/2025    CALCIUM 9.3 02/05/2025    PROT 7.7 02/05/2025    ALBUMIN 4.1 02/05/2025    BILITOT 0.7 02/05/2025    ALKPHOS 40 02/05/2025    AST 22 02/05/2025    ALT 33 02/05/2025    ANIONGAP 9 02/05/2025    ESTGFRAFRICA >60.0 01/28/2022    EGFRNONAA >60.0 01/28/2022    TSH 7.621 (H) 02/05/2025       All pertinent labs reviewed    Assessment and Plan     Type 2 diabetes mellitus with mild nonproliferative retinopathy of right eye, with long-term current use of insulin  Unsure of control. No recent CGM data. Started in office. Will review in 2 weeks.    I renewed CGM prescription.    Continue MDI, SGLT-2 inhibitor and GLP-1/GIP RA    Plan  - Continue Toujeo 60 U HS  - Continue Humalog 20 U ACTID  - Continue Mounjaro 12.5 mg weekly  - Continue Jardiance 10 mg daily  - Continue FreeStyle Maritza 3 CGM    Labs per PCP    F/u 2 months    BMI 35.0-35.9,adult  Continue GLP-1/GIP RA given weight loss benefit    Hypothyroidism due to acquired atrophy of thyroid  Last TSH above goal  Levothyroxine adjusted to 137 mcg daily by PCP, labs per PCP in Sep 2025    Mixed hyperlipidemia  Continue statin  Monitored by PCP    Mendez Roberts MD   Endocrinology              "

## 2025-05-16 NOTE — ASSESSMENT & PLAN NOTE
Unsure of control. No recent CGM data. Started in office. Will review in 2 weeks.    I renewed CGM prescription.    Continue MDI, SGLT-2 inhibitor and GLP-1/GIP RA    Plan  - Continue Toujeo 60 U HS  - Continue Humalog 20 U ACTID  - Continue Mounjaro 12.5 mg weekly  - Continue Jardiance 10 mg daily  - Continue FreeStyle Maritza 3 CGM    Labs per PCP    F/u 2 months

## 2025-05-30 ENCOUNTER — PATIENT MESSAGE (OUTPATIENT)
Dept: ENDOCRINOLOGY | Facility: CLINIC | Age: 45
End: 2025-05-30
Payer: COMMERCIAL